# Patient Record
Sex: FEMALE | Race: WHITE | NOT HISPANIC OR LATINO | URBAN - METROPOLITAN AREA
[De-identification: names, ages, dates, MRNs, and addresses within clinical notes are randomized per-mention and may not be internally consistent; named-entity substitution may affect disease eponyms.]

---

## 2018-12-27 PROBLEM — Z00.00 ENCOUNTER FOR PREVENTIVE HEALTH EXAMINATION: Status: ACTIVE | Noted: 2018-12-27

## 2021-08-05 ENCOUNTER — INPATIENT (INPATIENT)
Facility: HOSPITAL | Age: 86
LOS: 2 days | Discharge: HOME | DRG: 187 | End: 2021-08-08
Attending: HOSPITALIST | Admitting: INTERNAL MEDICINE
Payer: MEDICARE

## 2021-08-05 ENCOUNTER — RESULT REVIEW (OUTPATIENT)
Age: 86
End: 2021-08-05

## 2021-08-05 VITALS
WEIGHT: 100.09 LBS | RESPIRATION RATE: 18 BRPM | HEART RATE: 73 BPM | HEIGHT: 62 IN | SYSTOLIC BLOOD PRESSURE: 103 MMHG | TEMPERATURE: 98 F | DIASTOLIC BLOOD PRESSURE: 67 MMHG | OXYGEN SATURATION: 96 %

## 2021-08-05 DIAGNOSIS — J90 PLEURAL EFFUSION, NOT ELSEWHERE CLASSIFIED: ICD-10-CM

## 2021-08-05 DIAGNOSIS — I48.91 UNSPECIFIED ATRIAL FIBRILLATION: ICD-10-CM

## 2021-08-05 LAB
ALBUMIN SERPL ELPH-MCNC: 3.7 G/DL — SIGNIFICANT CHANGE UP (ref 3.3–5.2)
ALP SERPL-CCNC: 67 U/L — SIGNIFICANT CHANGE UP (ref 40–120)
ALT FLD-CCNC: 11 U/L — SIGNIFICANT CHANGE UP
ANION GAP SERPL CALC-SCNC: 11 MMOL/L — SIGNIFICANT CHANGE UP (ref 5–17)
AST SERPL-CCNC: 25 U/L — SIGNIFICANT CHANGE UP
BASOPHILS # BLD AUTO: 0.04 K/UL — SIGNIFICANT CHANGE UP (ref 0–0.2)
BASOPHILS NFR BLD AUTO: 0.9 % — SIGNIFICANT CHANGE UP (ref 0–2)
BILIRUB SERPL-MCNC: 0.9 MG/DL — SIGNIFICANT CHANGE UP (ref 0.4–2)
BUN SERPL-MCNC: 16.7 MG/DL — SIGNIFICANT CHANGE UP (ref 8–20)
CALCIUM SERPL-MCNC: 9.6 MG/DL — SIGNIFICANT CHANGE UP (ref 8.6–10.2)
CHLORIDE SERPL-SCNC: 102 MMOL/L — SIGNIFICANT CHANGE UP (ref 98–107)
CO2 SERPL-SCNC: 22 MMOL/L — SIGNIFICANT CHANGE UP (ref 22–29)
CREAT SERPL-MCNC: 0.64 MG/DL — SIGNIFICANT CHANGE UP (ref 0.5–1.3)
EOSINOPHIL # BLD AUTO: 0.1 K/UL — SIGNIFICANT CHANGE UP (ref 0–0.5)
EOSINOPHIL NFR BLD AUTO: 2.2 % — SIGNIFICANT CHANGE UP (ref 0–6)
GLUCOSE SERPL-MCNC: 101 MG/DL — HIGH (ref 70–99)
HCT VFR BLD CALC: 35.6 % — SIGNIFICANT CHANGE UP (ref 34.5–45)
HGB BLD-MCNC: 12.2 G/DL — SIGNIFICANT CHANGE UP (ref 11.5–15.5)
IMM GRANULOCYTES NFR BLD AUTO: 0.2 % — SIGNIFICANT CHANGE UP (ref 0–1.5)
LYMPHOCYTES # BLD AUTO: 0.66 K/UL — LOW (ref 1–3.3)
LYMPHOCYTES # BLD AUTO: 14.3 % — SIGNIFICANT CHANGE UP (ref 13–44)
MCHC RBC-ENTMCNC: 31.6 PG — SIGNIFICANT CHANGE UP (ref 27–34)
MCHC RBC-ENTMCNC: 34.3 GM/DL — SIGNIFICANT CHANGE UP (ref 32–36)
MCV RBC AUTO: 92.2 FL — SIGNIFICANT CHANGE UP (ref 80–100)
MONOCYTES # BLD AUTO: 0.37 K/UL — SIGNIFICANT CHANGE UP (ref 0–0.9)
MONOCYTES NFR BLD AUTO: 8 % — SIGNIFICANT CHANGE UP (ref 2–14)
NEUTROPHILS # BLD AUTO: 3.45 K/UL — SIGNIFICANT CHANGE UP (ref 1.8–7.4)
NEUTROPHILS NFR BLD AUTO: 74.4 % — SIGNIFICANT CHANGE UP (ref 43–77)
NT-PROBNP SERPL-SCNC: 1057 PG/ML — HIGH (ref 0–300)
PLATELET # BLD AUTO: 202 K/UL — SIGNIFICANT CHANGE UP (ref 150–400)
POTASSIUM SERPL-MCNC: 4 MMOL/L — SIGNIFICANT CHANGE UP (ref 3.5–5.3)
POTASSIUM SERPL-SCNC: 4 MMOL/L — SIGNIFICANT CHANGE UP (ref 3.5–5.3)
PROT SERPL-MCNC: 6.8 G/DL — SIGNIFICANT CHANGE UP (ref 6.6–8.7)
RBC # BLD: 3.86 M/UL — SIGNIFICANT CHANGE UP (ref 3.8–5.2)
RBC # FLD: 13.7 % — SIGNIFICANT CHANGE UP (ref 10.3–14.5)
SARS-COV-2 RNA SPEC QL NAA+PROBE: SIGNIFICANT CHANGE UP
SODIUM SERPL-SCNC: 135 MMOL/L — SIGNIFICANT CHANGE UP (ref 135–145)
WBC # BLD: 4.63 K/UL — SIGNIFICANT CHANGE UP (ref 3.8–10.5)
WBC # FLD AUTO: 4.63 K/UL — SIGNIFICANT CHANGE UP (ref 3.8–10.5)

## 2021-08-05 PROCEDURE — 71260 CT THORAX DX C+: CPT | Mod: 26,MG

## 2021-08-05 PROCEDURE — 99222 1ST HOSP IP/OBS MODERATE 55: CPT

## 2021-08-05 PROCEDURE — G1004: CPT

## 2021-08-05 PROCEDURE — 99285 EMERGENCY DEPT VISIT HI MDM: CPT | Mod: GC

## 2021-08-05 PROCEDURE — 72125 CT NECK SPINE W/O DYE: CPT | Mod: 26,MH

## 2021-08-05 PROCEDURE — 70450 CT HEAD/BRAIN W/O DYE: CPT | Mod: 26,MH

## 2021-08-05 PROCEDURE — 93010 ELECTROCARDIOGRAM REPORT: CPT

## 2021-08-05 PROCEDURE — 99221 1ST HOSP IP/OBS SF/LOW 40: CPT

## 2021-08-05 RX ORDER — DILTIAZEM HCL 120 MG
60 CAPSULE, EXT RELEASE 24 HR ORAL
Refills: 0 | Status: DISCONTINUED | OUTPATIENT
Start: 2021-08-05 | End: 2021-08-05

## 2021-08-05 RX ORDER — ACETAMINOPHEN 500 MG
650 TABLET ORAL EVERY 6 HOURS
Refills: 0 | Status: DISCONTINUED | OUTPATIENT
Start: 2021-08-05 | End: 2021-08-08

## 2021-08-05 RX ORDER — METOPROLOL TARTRATE 50 MG
25 TABLET ORAL EVERY 12 HOURS
Refills: 0 | Status: DISCONTINUED | OUTPATIENT
Start: 2021-08-05 | End: 2021-08-08

## 2021-08-05 RX ORDER — METOPROLOL TARTRATE 50 MG
25 TABLET ORAL ONCE
Refills: 0 | Status: COMPLETED | OUTPATIENT
Start: 2021-08-05 | End: 2021-08-05

## 2021-08-05 RX ORDER — POLYETHYLENE GLYCOL 3350 17 G/17G
17 POWDER, FOR SOLUTION ORAL
Refills: 0 | Status: DISCONTINUED | OUTPATIENT
Start: 2021-08-05 | End: 2021-08-08

## 2021-08-05 RX ORDER — APIXABAN 2.5 MG/1
2.5 TABLET, FILM COATED ORAL EVERY 12 HOURS
Refills: 0 | Status: DISCONTINUED | OUTPATIENT
Start: 2021-08-05 | End: 2021-08-05

## 2021-08-05 RX ORDER — LANOLIN ALCOHOL/MO/W.PET/CERES
3 CREAM (GRAM) TOPICAL AT BEDTIME
Refills: 0 | Status: DISCONTINUED | OUTPATIENT
Start: 2021-08-05 | End: 2021-08-08

## 2021-08-05 RX ORDER — METOPROLOL TARTRATE 50 MG
25 TABLET ORAL DAILY
Refills: 0 | Status: DISCONTINUED | OUTPATIENT
Start: 2021-08-05 | End: 2021-08-05

## 2021-08-05 RX ORDER — DILTIAZEM HCL 120 MG
120 CAPSULE, EXT RELEASE 24 HR ORAL DAILY
Refills: 0 | Status: DISCONTINUED | OUTPATIENT
Start: 2021-08-05 | End: 2021-08-08

## 2021-08-05 RX ORDER — METOPROLOL TARTRATE 50 MG
2.5 TABLET ORAL ONCE
Refills: 0 | Status: COMPLETED | OUTPATIENT
Start: 2021-08-05 | End: 2021-08-05

## 2021-08-05 RX ADMIN — Medication 120 MILLIGRAM(S): at 21:18

## 2021-08-05 RX ADMIN — Medication 2.5 MILLIGRAM(S): at 11:11

## 2021-08-05 RX ADMIN — Medication 25 MILLIGRAM(S): at 11:11

## 2021-08-05 RX ADMIN — Medication 25 MILLIGRAM(S): at 19:00

## 2021-08-05 NOTE — CONSULT NOTE ADULT - SUBJECTIVE AND OBJECTIVE BOX
HPI: 89 year old female w AFIB on eliquis, CHF, HTN arrives from [facility]  to ED by ambulance after fall , hitting head. Pt fell out of wheelchair striking L cheek and head, this occurred when she was getting into her wheelchair, fell  hitting her head on dresser no LOC. Reported a fall in [place] 2 months ago from wheelchair w Right leg soft tissue injury. CT surgery was consulted after CT chest revealed right pleural effusion.  At the time of my examination patient lying in bed, no acute distress noted, denies chest pian, pressure, palpitation, shortness of breath, SpO2 97% on room air, no accessory muscle use noted.     PAST MEDICAL HX  AFIB atrial fibrillation, unspecified type    CHF congestive heart failure, unspecified HF chronicity, unspecified heart failure type    Falls  HTN hypertension, unspecified type  Pulmonary nodule on R      PAST SURGICAL HX  L knee replacement  Cataracts        FAMILY HX  was not able to obtain      SOCIAL HX  just moved from [place] back to [facility]  nonsmoker  nonambulatory (05 Aug 2021 14:33)      PAST MEDICAL & SURGICAL HISTORY:  Hypertension, unspecified type    Congestive heart failure, unspecified HF chronicity, unspecified heart failure type    Atrial fibrillation, unspecified type    No significant past surgical history        MEDICATIONS  (STANDING):  diltiazem    milliGRAM(s) Oral daily  metoprolol tartrate 25 milliGRAM(s) Oral every 12 hours  polyethylene glycol 3350 17 Gram(s) Oral two times a day    MEDICATIONS  (PRN):  acetaminophen   Tablet .. 650 milliGRAM(s) Oral every 6 hours PRN Temp greater or equal to 38.5C (101.3F), Mild Pain (1 - 3)  aluminum hydroxide/magnesium hydroxide/simethicone Suspension 30 milliLiter(s) Oral every 4 hours PRN Dyspepsia  melatonin 3 milliGRAM(s) Oral at bedtime PRN Insomnia    Relevant Family History  FAMILY HISTORY:      Review of Systems  GENERAL:  Fevers[] chills[] sweats[] fatigue[] weight loss[] weight gain []                                      [x ] NEGATIVE  NEURO:  parathesias[] seizures []  syncope []  confusion []                                                                [ x] NEGATIVE                 EYES: glasses[]  blurry vision[]  discharge[] pain[] glaucoma []                                                           [x ] NEGATIVE                 ENMT:  difficulty hearing []  vertigo[]  dysphagia[] epistaxis[] recent dental work []                     [x ] NEGATIVE                 CV:  chest pain[] palpitations[] OSWALD [] diaphoresis [] edema[]                                                           [x ] NEGATIVE                                 RESPIRATORY:  wheezing[] SOB[] cough [x] sputum[] hemoptysis[]                                                   [ ] NEGATIVE               GI:  nausea[]  vomiting []  diarrhea[] constipation [] melena []                                                          [x ] NEGATIVE            : hematuria[ ]  dysuria[ ] urgency[] incontinence[]                                                                          [x ] NEGATIVE                    MUSKULOSKELETAL:  arthritis[ ]  joint swelling [ ] muscle weakness [ ]                                            [x ] NEGATIVE                     SKIN/BREAST:  rash[ ] itching [ ]  hair loss[ ] masses[ ]                                                                          [ ] NEGATIVE                     PSYCH:  dementia [ ] depression [ ] anxiety[ ]                                                                                          [x ] NEGATIVE                        HEME/LYMPH:  bruises easily[ ] enlarged lymph nodes[ ] tender lymph nodes[ ]                           [x] NEGATIVE                      ENDOCRINE:  cold intolerance[ ] heat intolerance[ ] polydipsia[ ]                                                      [x ] NEGATIVE                            PHYSICAL EXAM  General:  no acute distress.                                                         Neuro: Alert, awake, no focal deficits noted  Neck: Supple, no JVD  Lungs: Diminished breath sounds on right base, left clear. No rales, rhonchi or wheezes. No accessory muscle use noted                                                                      CVS: Irregular, normal S1S2  Abdomen:  soft, nontender, nondistended, positive bowel sounds  Extremities:  warm, well perfused, no edema, +DP pulses bilaterally  Skin: Abrasion on left side of the cheek. Scab on right shin, intact.                                                                           Vital Signs Last 24 Hrs  T(C): 37 (05 Aug 2021 19:26), Max: 37 (05 Aug 2021 19:26)  T(F): 98.6 (05 Aug 2021 19:26), Max: 98.6 (05 Aug 2021 19:26)  HR: 118 (05 Aug 2021 19:26) (73 - 136)  BP: 120/67 (05 Aug 2021 19:26) (103/67 - 136/60)  BP(mean): --  RR: 18 (05 Aug 2021 19:26) (18 - 20)  SpO2: 96% (05 Aug 2021 19:26) (96% - 100%) on room air                                                          LABS:                        12.2   4.63  )-----------( 202      ( 05 Aug 2021 10:47 )             35.6     08-05    135  |  102  |  16.7  ----------------------------<  101<H>  4.0   |  22.0  |  0.64    Ca    9.6      05 Aug 2021 10:47    TPro  6.8  /  Alb  3.7  /  TBili  0.9  /  DBili  x   /  AST  25  /  ALT  11  /  AlkPhos  67  08-05    < from: CT Chest w/ IV Cont (08.05.21 @ 12:03) >    IMPRESSION:  Large right pleural effusion.    11 mm spiculated right upper lobe pulmonary nodule suspicious for neoplasm.    Mild mediastinal and right hilar lymphadenopathy.    Chronic benign-appearing deformities of the left second and third ribs and left scapula.    Comminuted right humeral head fracture which is likely chronic.    < end of copied text >

## 2021-08-05 NOTE — ED ADULT TRIAGE NOTE - MODE OF ARRIVAL
Discharge instructions given through interpretor. Pt understands, no new questions.    EMS Ambulance

## 2021-08-05 NOTE — CONSULT NOTE ADULT - PROBLEM SELECTOR RECOMMENDATION 2
On Eliquis, last dose last night  Will hold today for chest tube placement in AM  Continue Metoprolol, Cardizem  Continue further care per primary team

## 2021-08-05 NOTE — H&P ADULT - HISTORY OF PRESENT ILLNESS
89 year old female w AFIB on eliquis, CHF, HTN arrives from [facility]  to ED by ambulance after fall , hitting head    Pt fell out of wheelchair striking L cheek and head  this occurred when she was getting into her wheelchair  fell  hitting her head on dresser no LOC      In ED /67  HR 73  RR 18   T 98.1   96% RA  EKG AFIB 111 w NSST-T changes  then AFIB 140 on monitor  lopressor 2.5 mg IV then 25 mg po  CT head and Cspine no acute changes  remodeling of post rib #2 , #3 and scapula; thyroid nodule 1.9 and large R pleural effusion  CT chest pending        PAST MEDICAL HX  AFIB atrial fibrillation, unspecified type    CHF congestive heart failure, unspecified HF chronicity, unspecified heart failure type    Falls  HTN hypertension, unspecified type.  Pulmonary nodule on R      PAST SURGICAL HX  L knee replacement  Cataracts        FAMILY HX  was not able to obtain      SOCIAL HX  just moved from [place] back to [facility]  nonsmoker  nonambulatory 89 year old female w AFIB on eliquis, CHF, HTN arrives from [facility]  to ED by ambulance after fall , hitting head    Pt fell out of wheelchair striking L cheek and head  this occurred when she was getting into her wheelchair  fell  hitting her head on dresser no LOC    Reported a fall in [place] 2 months ago from wheelchair w R leg soft tissue injury    Ambulance call report not available for review      In ED /67  HR 73  RR 18   T 98.1   96% RA  EKG AFIB 111 w NSST-T changes  then AFIB 140 on monitor  lopressor 2.5 mg IV then 25 mg po  CT head and Cspine no acute changes  remodeling of post rib #2 , #3 and scapula; thyroid nodule 1.9 and large R pleural effusion  CT chest pending        PAST MEDICAL HX  AFIB atrial fibrillation, unspecified type    CHF congestive heart failure, unspecified HF chronicity, unspecified heart failure type    Falls  HTN hypertension, unspecified type.  Pulmonary nodule on R      PAST SURGICAL HX  L knee replacement  Cataracts        FAMILY HX  was not able to obtain      SOCIAL HX  just moved from [place] back to [facility]  nonsmoker  nonambulatory

## 2021-08-05 NOTE — ED ADULT NURSE NOTE - ALCOHOL PRE SCREEN (AUDIT - C)
Family does not want veronica placed due to increased risk of bleeding from the tPA and potential trauma of trying to insert veronica. Pure wick placed instead.       Josee Perry RN  03/26/21 1954 Statement Selected

## 2021-08-05 NOTE — ED PROVIDER NOTE - CLINICAL SUMMARY MEDICAL DECISION MAKING FREE TEXT BOX
89 y/o F with PMHx HTN, CHF, AFib on Eliquis, who was BIBA s/p fall out of wheelchair and head strike on night-stand while getting out of bed this morning. 89 y/o F with PMHx HTN, CHF, AFib on Eliquis, who was BIBA s/p fall out of wheelchair and head strike on night-stand while getting out of bed this morning. Non-con CT Head negative for bleed. Incidental findings of L 2nd/3rd rib lesions, thyroid nodules, and large R pleural effusion. Ordered basic labs and CT chest with IV contrast. 89 y/o F with PMHx HTN, CHF, AFib on Eliquis, who was BIBA s/p fall out of wheelchair and head strike on night-stand while getting out of bed this morning. Non-con CT Head negative for bleed. Incidental findings of L 2nd/3rd rib lesions, thyroid nodules, and large R pleural effusion. CT Chest with IV contrast showing large R sided pleural effusion and well as 11 mm mass c/f malignancy. Admit for further management of potential malignant effusion.

## 2021-08-05 NOTE — ED ADULT NURSE NOTE - OBJECTIVE STATEMENT
pt a+ox3, presents to ED s/p fall from bed this morning. pt states she has right sided weakness every now and then, reports feeling a little weaker than normal this morning and lost footing while getting out of bed and slipped forward. pt reports hitting left cheek, denies LOC. pt offers no complaints at this time. will continue to monitor.

## 2021-08-05 NOTE — ED PROVIDER NOTE - NS ED ROS FT
Constitutional: no fever, no chills  Head: NC  Eyes: no redness   ENMT: no nasal congestion/drainage, no sore throat   CV: no chest pain, no edema  Resp: no cough, no dyspnea  GI: no abdominal pain, no nausea, no vomiting, no diarrhea  : no dysuria, no hematuria   Skin: no lesions, no rashes   Neuro: no LOC, no headache, no sensory deficits, no weakness

## 2021-08-05 NOTE — CONSULT NOTE ADULT - ASSESSMENT
89 year old female with PMH of AFIB on Eliquis CHF, HTN arrives from [facility]  to ED by ambulance after fall , hitting head after a mechanical fall.  Reported a fall in [place] 2 months ago from wheelchair w Right leg soft tissue injury. CT surgery was consulted after CT chest revealed right pleural effusion.

## 2021-08-05 NOTE — H&P ADULT - NSHPLABSRESULTS_GEN_ALL_CORE
EXAM: CT CHEST IC    PROCEDURE DATE: 08/05/2021        INTERPRETATION: CLINICAL INFORMATION: Right pleural effusion and left second/third rib lesions    COMPARISON: CT cervical spine from the same day was reviewed.    CONTRAST/COMPLICATIONS:  IV Contrast: Omnipaque 300 95 cc administered 5 cc discarded  Oral Contrast: NONE  Complications: None reported at time of study completion    PROCEDURE:  CT of the Chest was performed.  Sagittal and coronal reformats were performed.    FINDINGS:    LUNGS AND AIRWAYS: Patent central airways. Mild emphysema.  * Right upper lobe pulmonary nodule (3; 23).  * 11 mm spiculated right upper lobe nodule (3; 42).  PLEURA: Large right pleural effusion.  MEDIASTINUM AND ASHLYN:  * Mildly enlarged mediastinal lymph nodes measuring up to 1.1 cm in the lower paratracheal region (3; 58).  * 10 mm right hilar lymph node (3; 64).  VESSELS: Atherosclerotic arterial calcifications, including three-vessel coronary artery calcification.  HEART: Cardiomegaly. No pericardial effusion.  CHEST WALL AND LOWER NECK: Small bilateral thyroid nodules.  VISUALIZED UPPER ABDOMEN: Small hiatal hernia. High density in the gallbladder lumen could represent sludge or stones. Ectopic location of the right kidney, located in the anterior right upper quadrant.  BONES: Mild chronic appearing deformity of the left second and third ribs. Comminuted right humeral head fracture which appears chronic.    IMPRESSION:  Large right pleural effusion.    11 mm spiculated right upper lobe pulmonary nodule suspicious for neoplasm.    Mild mediastinal and right hilar lymphadenopathy.    Chronic benign-appearing deformities of the left second and third ribs and left scapula.    Comminuted right humeral head fracture which is likely chronic.      --- End of Report ---      ISATU EARL MD; Attending Radiologist  This document has been electronically signed. Aug 5 2021 12:23PM

## 2021-08-05 NOTE — CONSULT NOTE ADULT - PROBLEM SELECTOR RECOMMENDATION 9
CT chest with moderate to large pleural effusion on right   Patient is currently on room air, no respiratory distress noted, no accessory muscle use, able to communicate in full sentences, spo2 97% on room air  Patient receives Eliquis for Afib, last dose last night  Will hold Eliquis today for chest tube placement in AM  Please continue to monitor, continue tele, supplemental O2 as needed  Plan discussed with Dr. Chatman

## 2021-08-05 NOTE — ED ADULT NURSE NOTE - CHIEF COMPLAINT QUOTE
patient arrives from [facility] for fall today while she was trying to get back into bed. she fell backwards and hit head on dresser. patient is on Eliquis and has a hematoma to the back of the head. MD called to bedside and Priority CT called. Patient is alert and oriented x3. and denies LOC

## 2021-08-05 NOTE — ED PROVIDER NOTE - PHYSICAL EXAMINATION
General: well appearing, NAD  Head: NC, small hematoma on posterior L occipital lobe, no spinal tenderness/step-offs  EENT: EOMI, no scleral icterus  Cardiac: RRR, no apparent murmurs, no lower extremity edema  Respiratory: CTABL, no respiratory distress   Abdomen: soft, ND, NT, nonperitonitic  MSK/Vascular: full ROM, soft compartments, warm extremities  Neuro: AAOx3, sensation to light touch intact  Psych: calm, cooperative

## 2021-08-05 NOTE — PHARMACOTHERAPY INTERVENTION NOTE - COMMENTS
Pt is on metoprolol tartrate 25 mG BID and diltiazem ER 60 mg BID. Recommended continuing outpatient medications with hold parameters to avoid interruptions in therapy. Pt is on metoprolol tartrate 25 mG BID and diltiazem ER 60 mg BID. Recommended continuing outpatient medications with hold parameters to avoid interruptions in therapy. Diltiazem ER 60 mG nonformulary. Recommended  mG qday or IR 30 mG q6h with hold parameters while patient is admitted.

## 2021-08-05 NOTE — H&P ADULT - NSHPPHYSICALEXAM_GEN_ALL_CORE
Vital Signs Last 24 Hrs  T(C): 36.7 (05 Aug 2021 08:37), Max: 36.7 (05 Aug 2021 08:37)  T(F): 98.1 (05 Aug 2021 08:37), Max: 98.1 (05 Aug 2021 08:37)  HR: 136 (05 Aug 2021 11:04) (73 - 136)  BP: 136/60 (05 Aug 2021 11:04) (103/67 - 136/60)  BP(mean): --  RR: 20 (05 Aug 2021 11:04) (18 - 20)  SpO2: 100% (05 Aug 2021 11:04) (96% - 100%)      Telemedicine precludes detailed examination  pt is alert and oriented  normal speech  +L malar ecchymosis and smaller ecchymosis upper and lower lip

## 2021-08-05 NOTE — H&P ADULT - ASSESSMENT
#Mechanical fall: initial encounter  #Soft tissue injury to head and face  1. admit to med  2. neuro check q 4 hrs  3. fall risk        #AFIB  #had -140 in ED  #HTN  1. monitor on telemetry  2. serial trop  3. s/p lopressor 2.5 mg IV and 25 mg po  4  metorprolol 25 mg qd w parameters  5. diltiazem 60 mg BID w parameters  6. eliquis 2.5 mg        #Large R pleural effusion  #spiculated mass w rib #2 #3 and scapula remodeling  pt states she will not proceed w any work up until family arrives  1. monitor O2 sat  2. to consider thoracentesis for dx      #VTE  IMPROVE VTE Individual Risk Assessment    RISK                                                                Points    [  ] Previous VTE                                                  3    [  ] Thrombophilia                                               2    [  ] Lower limb paralysis                                      2        (unable to hold up >15 seconds)      [  ] Current Cancer                                              2         (within 6 months)    [  ] Immobilization > 24 hrs                                1    [  ] ICU/CCU stay > 24 hours                              1    [ X ] Age > 60                                                      1    IMPROVE VTE Score __1-3______    IMPROVE Score 0-1: Low Risk, No VTE prophylaxis required for most patients, encourage ambulation.   IMPROVE Score 2-3: At risk, pharmacologic VTE prophylaxis is indicated for most patients (in the absence of a contraindication)  IMPROVE Score > or = 4: High Risk, pharmacologic VTE prophylaxis is indicated for most patients (in the absence of a contraindication)      VTE on eliquis  med rec from patient paper w list to ED      FOLLOW UP  exam  CT chest  discuss w pt and family work up         #Mechanical fall: initial encounter  #Soft tissue injury to head and face  1. admit to med  2. neuro check q 4 hrs  3. fall risk        #AFIB  #had -140 in ED  #HTN  1. monitor on telemetry  2. serial trop  3. s/p lopressor 2.5 mg IV and 25 mg po  4  metorprolol 25 mg qd w parameters  5. diltiazem 60 mg BID w parameters  6. eliquis 2.5 mg        #Large R pleural effusion  #spiculated mass w rib #2 #3 and scapula remodeling  pt states she will not proceed w any work up until family arrives  1. monitor O2 sat  2. to consider thoracentesis for dx      #VTE  IMPROVE VTE Individual Risk Assessment    RISK                                                                Points    [  ] Previous VTE                                                  3    [  ] Thrombophilia                                               2    [  ] Lower limb paralysis                                      2        (unable to hold up >15 seconds)      [  ] Current Cancer                                              2         (within 6 months)    [  ] Immobilization > 24 hrs                                1    [  ] ICU/CCU stay > 24 hours                              1    [ X ] Age > 60                                                      1    IMPROVE VTE Score __1-3______    IMPROVE Score 0-1: Low Risk, No VTE prophylaxis required for most patients, encourage ambulation.   IMPROVE Score 2-3: At risk, pharmacologic VTE prophylaxis is indicated for most patients (in the absence of a contraindication)  IMPROVE Score > or = 4: High Risk, pharmacologic VTE prophylaxis is indicated for most patients (in the absence of a contraindication)      VTE on eliquis  med rec from patient paper w list to ED  contacted ED pharmacy to assist in completing med rec    FOLLOW UP  exam  CT chest  discuss w pt and family work up         #Mechanical fall: initial encounter  #Soft tissue injury to head and face  1. admit to med  2. neuro check q 4 hrs  3. fall risk        #AFIB  #had -140 in ED  #HTN  1. monitor on telemetry  2. serial trop  3. s/p lopressor 2.5 mg IV and 25 mg po  4  metorprolol 25 mg qd w parameters  5. diltiazem 60 mg BID w parameters  6. eliquis 2.5 mg BID        #Large R pleural effusion  #spiculated mass w rib #2 #3 and scapula remodeling  pt states she will not proceed w any work up until family arrives  1. monitor O2 sat  2. to consider thoracentesis for dx      #VTE  IMPROVE VTE Individual Risk Assessment    RISK                                                                Points    [  ] Previous VTE                                                  3    [  ] Thrombophilia                                               2    [  ] Lower limb paralysis                                      2        (unable to hold up >15 seconds)      [  ] Current Cancer                                              2         (within 6 months)    [  ] Immobilization > 24 hrs                                1    [  ] ICU/CCU stay > 24 hours                              1    [ X ] Age > 60                                                      1    IMPROVE VTE Score __1-3______    IMPROVE Score 0-1: Low Risk, No VTE prophylaxis required for most patients, encourage ambulation.   IMPROVE Score 2-3: At risk, pharmacologic VTE prophylaxis is indicated for most patients (in the absence of a contraindication)  IMPROVE Score > or = 4: High Risk, pharmacologic VTE prophylaxis is indicated for most patients (in the absence of a contraindication)      VTE on eliquis  med rec from patient paper w list to ED  contacted ED pharmacy to assist in completing med rec    FOLLOW UP  exam  discuss w pt and family work up  delayed CT surgery consult

## 2021-08-05 NOTE — CHART NOTE - NSCHARTNOTEFT_GEN_A_CORE
SOCIAL WORK NOTE:  PATIENT IS FROM THE [facility]. PATIENT RECENTLY MOVED IN THERE FROM [place], [place].  PATIENT SUSTAINED A FALL FROM WHEELCHAIR AND HIT HEAD ON CORNER OF NIGHT STAND. PATIENT IS ALERT AND ORIENTED X 3.  SPOKE WITH GRANDDAUGHTER- KEESHA SIMONS # [phone] WHOM APPRISED ME OF ABOVE INFORMATION. SHE REPORTS PATIENT TO BE AXOX4, PLEASANT AND COOPERATIVE.

## 2021-08-05 NOTE — ED PROVIDER NOTE - OBJECTIVE STATEMENT
89 y/o F with PMHx HTN, CHF, AFib on Eliquis, who was BIBA s/p fall out of wheelchair and head strike on night-stand while getting out of bed this morning. Patient states that she was getting into her wheelchair when she slid out and hit her head on her night stand. Denies any LOC. Per EMS, on Eliquis. States that she has baseline L sided weakness. Denies any dizziness, lightheadedness, changes in vision, chest pain, shortness of breath, N/V/D, or abdominal pain. 89 y/o F with PMHx HTN, CHF, AFib on Eliquis, who was BIBA s/p fall out of wheelchair and head strike on night-stand while getting out of bed this morning. Patient states that she was getting into her wheelchair when she slid out and hit her head on her night stand. Denies any LOC. Per EMS, on Eliquis. States that she has baseline L sided weakness. Denies any recent illnesses. States that she just moved to [place] from [place] two days ago and lives at [facility]. States that she does not have a cardiologist here. Denies any dizziness, lightheadedness, changes in vision, chest pain, shortness of breath, N/V/D, or abdominal pain.

## 2021-08-05 NOTE — ED PROVIDER NOTE - ATTENDING CONTRIBUTION TO CARE
AJM: 89 y/o F with PMHx HTN, CHF, AFib on Eliquis, who was BIBA s/p fall out of wheelchair and head strike on night-stand while getting out of bed this morning. Patient states that she was getting into her wheelchair when she slid out and hit her head on her night stand. Denies any LOC. Per EMS, on Eliquis. no new neuro deficits. will obtain ct head and cspine. no other injuries noted. no hip pain. no lacs

## 2021-08-05 NOTE — ED PROVIDER NOTE - PMH
Atrial fibrillation, unspecified type    Congestive heart failure, unspecified HF chronicity, unspecified heart failure type    Hypertension, unspecified type

## 2021-08-05 NOTE — H&P ADULT - ATTENDING COMMENTS
patient is 89 year old female w AFIB on eliquis, CHF, HTN arrives from [facility]  to ED by ambulance after fall , hitting head. patient recently moved from [place] to be close to her family and moved to [place]. Pt fell out of wheelchair striking L cheek and head while  she was getting into her wheelchair, patient fell  hitting her head on dresser, denies any  LOC. in ed  patient  had CT head and Cspine no acute changes  remodeling of post rib #2 , #3 and scapula; thyroid nodule 1.9 and large R pleural effusion. CT chest showed rt sided large plural effusion and spiculated mass. findings discussed with patient and son and daughter in law at bedside. patient is ok to be admitted for fluid drainage but doesnot want to stay longer for test results. patient on eliquis , last dose taken last pm. patient denies any sob , cp, dizziness.     Vital Signs Last 24 Hrs  T(C): 36.6 (05 Aug 2021 15:39), Max: 36.7 (05 Aug 2021 08:37)  T(F): 97.9 (05 Aug 2021 15:39), Max: 98.1 (05 Aug 2021 08:37)  HR: 105 (05 Aug 2021 15:39) (73 - 136)  BP: 106/61 (05 Aug 2021 15:39) (103/67 - 136/60)  BP(mean): --  RR: 18 (05 Aug 2021 15:39) (18 - 20)  SpO2: 97% (05 Aug 2021 15:39) (96% - 100%)    gen : awake , alert , x 4 , hog , comfortable.  heen t: left cheek scab noted , dried blood   cvs : s1,s2, rrr   resp : diminished bs on rt side. good air entry left lung   abd: soft , nt , bs nl   ext : no edema b/l   neuro: non focal      < from: CT Chest w/ IV Cont (08.05.21 @ 12:03) >    IMPRESSION:  Large right pleural effusion.11 mm spiculated right upper lobe pulmonary nodule suspicious for neoplasm. Mild mediastinal and right hilar lymphadenopathy. Chronic benign-appearing deformities of the left second and third ribs and left scapula. Comminuted right humeral head fracture which is likely chronic.    > large right pleural effusion   - ct sx called , plan for dx/ therapeutic tap   -    - hold eliquis for tonight     > pt eval   > fall precautions   > c/w remainder of home meds

## 2021-08-05 NOTE — ED ADULT TRIAGE NOTE - CHIEF COMPLAINT QUOTE
patient arrives from [facility] for fall today while she was trying to get back into bed. she fell backwards and hit head on dresser. patient is on Eliquis and has a hematoma to the back of the head. MD called to bedside and Priority CT called. Patient is alert and oriented x3. and denies LOC

## 2021-08-06 LAB
A1C WITH ESTIMATED AVERAGE GLUCOSE RESULT: 5.5 % — SIGNIFICANT CHANGE UP (ref 4–5.6)
ALBUMIN FLD-MCNC: 1.7 G/DL — SIGNIFICANT CHANGE UP
ALBUMIN SERPL ELPH-MCNC: 3.4 G/DL — SIGNIFICANT CHANGE UP (ref 3.3–5.2)
ALP SERPL-CCNC: 62 U/L — SIGNIFICANT CHANGE UP (ref 40–120)
ALT FLD-CCNC: 9 U/L — SIGNIFICANT CHANGE UP
ANION GAP SERPL CALC-SCNC: 10 MMOL/L — SIGNIFICANT CHANGE UP (ref 5–17)
ANION GAP SERPL CALC-SCNC: 13 MMOL/L — SIGNIFICANT CHANGE UP (ref 5–17)
AST SERPL-CCNC: 21 U/L — SIGNIFICANT CHANGE UP
B PERT IGG+IGM PNL SER: ABNORMAL
BILIRUB SERPL-MCNC: 0.8 MG/DL — SIGNIFICANT CHANGE UP (ref 0.4–2)
BUN SERPL-MCNC: 13.8 MG/DL — SIGNIFICANT CHANGE UP (ref 8–20)
BUN SERPL-MCNC: 14.4 MG/DL — SIGNIFICANT CHANGE UP (ref 8–20)
CALCIUM SERPL-MCNC: 9.1 MG/DL — SIGNIFICANT CHANGE UP (ref 8.6–10.2)
CALCIUM SERPL-MCNC: 9.2 MG/DL — SIGNIFICANT CHANGE UP (ref 8.6–10.2)
CHLORIDE SERPL-SCNC: 102 MMOL/L — SIGNIFICANT CHANGE UP (ref 98–107)
CHLORIDE SERPL-SCNC: 102 MMOL/L — SIGNIFICANT CHANGE UP (ref 98–107)
CHOLEST SERPL-MCNC: 154 MG/DL — SIGNIFICANT CHANGE UP
CO2 SERPL-SCNC: 20 MMOL/L — LOW (ref 22–29)
CO2 SERPL-SCNC: 22 MMOL/L — SIGNIFICANT CHANGE UP (ref 22–29)
COLOR FLD: YELLOW
COMMENT - FLUIDS: SIGNIFICANT CHANGE UP
COVID-19 SPIKE DOMAIN AB INTERP: POSITIVE
COVID-19 SPIKE DOMAIN ANTIBODY RESULT: 175 U/ML — HIGH
CREAT SERPL-MCNC: 0.52 MG/DL — SIGNIFICANT CHANGE UP (ref 0.5–1.3)
CREAT SERPL-MCNC: 0.54 MG/DL — SIGNIFICANT CHANGE UP (ref 0.5–1.3)
ESTIMATED AVERAGE GLUCOSE: 111 MG/DL — SIGNIFICANT CHANGE UP (ref 68–114)
FLUID INTAKE SUBSTANCE CLASS: SIGNIFICANT CHANGE UP
FLUID SEGMENTED GRANULOCYTES: 6 % — SIGNIFICANT CHANGE UP
GLUCOSE FLD-MCNC: 96 MG/DL — SIGNIFICANT CHANGE UP
GLUCOSE SERPL-MCNC: 84 MG/DL — SIGNIFICANT CHANGE UP (ref 70–99)
GLUCOSE SERPL-MCNC: 84 MG/DL — SIGNIFICANT CHANGE UP (ref 70–99)
GRAM STN FLD: SIGNIFICANT CHANGE UP
HCT VFR BLD CALC: 34.8 % — SIGNIFICANT CHANGE UP (ref 34.5–45)
HDLC SERPL-MCNC: 69 MG/DL — SIGNIFICANT CHANGE UP
HGB BLD-MCNC: 11.8 G/DL — SIGNIFICANT CHANGE UP (ref 11.5–15.5)
INR BLD: 1.31 RATIO — HIGH (ref 0.88–1.16)
LDH SERPL L TO P-CCNC: 63 U/L — SIGNIFICANT CHANGE UP
LIPID PNL WITH DIRECT LDL SERPL: 72 MG/DL — SIGNIFICANT CHANGE UP
LYMPHOCYTES # FLD: 55 % — SIGNIFICANT CHANGE UP
MCHC RBC-ENTMCNC: 31.5 PG — SIGNIFICANT CHANGE UP (ref 27–34)
MCHC RBC-ENTMCNC: 33.9 GM/DL — SIGNIFICANT CHANGE UP (ref 32–36)
MCV RBC AUTO: 92.8 FL — SIGNIFICANT CHANGE UP (ref 80–100)
MESOTHL CELL # FLD: 4 % — SIGNIFICANT CHANGE UP
MONOS+MACROS # FLD: 35 % — SIGNIFICANT CHANGE UP
NON HDL CHOLESTEROL: 85 MG/DL — SIGNIFICANT CHANGE UP
PH FLD: 8.5 — SIGNIFICANT CHANGE UP
PLATELET # BLD AUTO: 183 K/UL — SIGNIFICANT CHANGE UP (ref 150–400)
POTASSIUM SERPL-MCNC: 3.6 MMOL/L — SIGNIFICANT CHANGE UP (ref 3.5–5.3)
POTASSIUM SERPL-MCNC: 3.7 MMOL/L — SIGNIFICANT CHANGE UP (ref 3.5–5.3)
POTASSIUM SERPL-SCNC: 3.6 MMOL/L — SIGNIFICANT CHANGE UP (ref 3.5–5.3)
POTASSIUM SERPL-SCNC: 3.7 MMOL/L — SIGNIFICANT CHANGE UP (ref 3.5–5.3)
PROT FLD-MCNC: 2.9 G/DL — SIGNIFICANT CHANGE UP
PROT SERPL-MCNC: 6.1 G/DL — LOW (ref 6.6–8.7)
PROTHROM AB SERPL-ACNC: 15 SEC — HIGH (ref 10.6–13.6)
RBC # BLD: 3.75 M/UL — LOW (ref 3.8–5.2)
RBC # FLD: 13.4 % — SIGNIFICANT CHANGE UP (ref 10.3–14.5)
RCV VOL RI: 1000 /UL — HIGH (ref 0–0)
SARS-COV-2 IGG+IGM SERPL QL IA: 175 U/ML — HIGH
SARS-COV-2 IGG+IGM SERPL QL IA: POSITIVE
SODIUM SERPL-SCNC: 134 MMOL/L — LOW (ref 135–145)
SODIUM SERPL-SCNC: 134 MMOL/L — LOW (ref 135–145)
SPECIMEN SOURCE FLD: SIGNIFICANT CHANGE UP
SPECIMEN SOURCE: SIGNIFICANT CHANGE UP
TOTAL NUCLEATED CELL COUNT, BODY FLUID: 252 /UL — SIGNIFICANT CHANGE UP
TRIGL SERPL-MCNC: 63 MG/DL — SIGNIFICANT CHANGE UP
TROPONIN T SERPL-MCNC: <0.01 NG/ML — SIGNIFICANT CHANGE UP (ref 0–0.06)
TSH SERPL-MCNC: 3.5 UIU/ML — SIGNIFICANT CHANGE UP (ref 0.27–4.2)
TUBE TYPE: SIGNIFICANT CHANGE UP
WBC # BLD: 5.19 K/UL — SIGNIFICANT CHANGE UP (ref 3.8–10.5)
WBC # FLD AUTO: 5.19 K/UL — SIGNIFICANT CHANGE UP (ref 3.8–10.5)

## 2021-08-06 PROCEDURE — 88112 CYTOPATH CELL ENHANCE TECH: CPT | Mod: 26

## 2021-08-06 PROCEDURE — 32557 INSERT CATH PLEURA W/ IMAGE: CPT

## 2021-08-06 PROCEDURE — 71045 X-RAY EXAM CHEST 1 VIEW: CPT | Mod: 26,77

## 2021-08-06 PROCEDURE — 71045 X-RAY EXAM CHEST 1 VIEW: CPT | Mod: 26,76

## 2021-08-06 PROCEDURE — 99231 SBSQ HOSP IP/OBS SF/LOW 25: CPT | Mod: 25

## 2021-08-06 PROCEDURE — 88305 TISSUE EXAM BY PATHOLOGIST: CPT | Mod: 26

## 2021-08-06 PROCEDURE — 99233 SBSQ HOSP IP/OBS HIGH 50: CPT

## 2021-08-06 RX ORDER — DILTIAZEM HCL 120 MG
1 CAPSULE, EXT RELEASE 24 HR ORAL
Qty: 0 | Refills: 0 | DISCHARGE

## 2021-08-06 RX ORDER — LACTOBACILLUS ACIDOPH-L.BULGARICUS 1 MILLION CELL CHEWABLE TABLET 1MM CELL
2 TABLET,CHEWABLE ORAL
Qty: 0 | Refills: 0 | DISCHARGE

## 2021-08-06 RX ORDER — METOPROLOL TARTRATE 50 MG
1 TABLET ORAL
Qty: 0 | Refills: 0 | DISCHARGE

## 2021-08-06 RX ORDER — POLYETHYLENE GLYCOL 3350 17 G/17G
17 POWDER, FOR SOLUTION ORAL
Qty: 0 | Refills: 0 | DISCHARGE

## 2021-08-06 RX ORDER — APIXABAN 2.5 MG/1
2.5 TABLET, FILM COATED ORAL EVERY 12 HOURS
Refills: 0 | Status: DISCONTINUED | OUTPATIENT
Start: 2021-08-07 | End: 2021-08-08

## 2021-08-06 RX ORDER — FLUOCINONIDE/EMOLLIENT BASE 0.05 %
1 CREAM (GRAM) TOPICAL
Qty: 0 | Refills: 0 | DISCHARGE

## 2021-08-06 RX ORDER — LIDOCAINE HCL 20 MG/ML
20 VIAL (ML) INJECTION ONCE
Refills: 0 | Status: DISCONTINUED | OUTPATIENT
Start: 2021-08-06 | End: 2021-08-08

## 2021-08-06 RX ORDER — CHOLECALCIFEROL (VITAMIN D3) 125 MCG
1 CAPSULE ORAL
Qty: 0 | Refills: 0 | DISCHARGE

## 2021-08-06 RX ADMIN — Medication 120 MILLIGRAM(S): at 05:17

## 2021-08-06 RX ADMIN — Medication 25 MILLIGRAM(S): at 05:17

## 2021-08-06 RX ADMIN — Medication 650 MILLIGRAM(S): at 14:30

## 2021-08-06 RX ADMIN — Medication 25 MILLIGRAM(S): at 16:59

## 2021-08-06 RX ADMIN — Medication 650 MILLIGRAM(S): at 15:43

## 2021-08-06 RX ADMIN — POLYETHYLENE GLYCOL 3350 17 GRAM(S): 17 POWDER, FOR SOLUTION ORAL at 05:17

## 2021-08-06 RX ADMIN — POLYETHYLENE GLYCOL 3350 17 GRAM(S): 17 POWDER, FOR SOLUTION ORAL at 16:59

## 2021-08-06 NOTE — PATIENT PROFILE ADULT - MEDICATIONS/VISITS
Body Location Override (Optional): left lateral upper forehead
Which Doctor Performed Mohs? (Optional): Dr. Gaurav Tobias
Year Removed: [address]
no

## 2021-08-06 NOTE — CHART NOTE - NSCHARTNOTEFT_GEN_A_CORE
Chest tube removed bedside without incident.  F/u post-removal CXR.  Cleared for discharge back to assisted living facility if post-removal CXR WNL and absent pneumothorax.  Thoracic Surgery to sign off.

## 2021-08-06 NOTE — CHART NOTE - NSCHARTNOTESELECT_GEN_ALL_CORE
Assessment/Plan:    Diagnoses and all orders for this visit:    Acute pain of left knee  -     Ambulatory referral to Orthopedic Surgery  -     MRI knee left  wo contrast; Future    Laxity of knee joint, left  -     MRI knee left  wo contrast; Future    + Lachmans test   I would like to obtain an MRI of the left knee to evaluate for 6 weeks of left knee pain and feeling of laxity after jumping off of his truck  Since that time is been experiencing pain which has been limiting him in his activities of daily living and occupation as he does perform a lot a walking  He does have a history of an ACL reconstruction with cadaver tissue and it is a possibility that this has ruptured as he does not have a solid endpoint on his left Lachmans test   I would like to obtain the MRI to guide further treatment including possible surgery  He may continue meloxicam and activity modification and rest   There is also concern for possible medial meniscus tear  Return for Follow Up After Imaging Study  Chief Complaint:     Chief Complaint   Patient presents with    Left Knee - Pain       Subjective:   Patient ID: Kay Hoff is a 44 y o  male  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 with swelling the first several weeks which he noted after jogging and jumping off of his truck with a hard landing  Since then he has been unable to run or exercise, and he is having a lot of pain and discomfort at work since he does perform a lot of walking  Denies any painful popping or clicking but does have a new sense of instability  He does have a history of a left ACL reconstruction  He has been taking Meloxicam which does seem to help his pain, and if he misses a dose he does have increased pain  Review of Systems   Constitutional: Negative for chills and fever  HENT: Negative for sore throat and trouble swallowing  Eyes: Negative for pain and discharge     Respiratory: Negative for choking Thoracic Surgery/Event Note and shortness of breath  Cardiovascular: Negative for chest pain and palpitations  Gastrointestinal: Negative for abdominal pain and vomiting  Endocrine: Negative for cold intolerance and heat intolerance  Musculoskeletal: Positive for arthralgias  Neurological: Negative for dizziness and weakness  Psychiatric/Behavioral: Negative for self-injury and suicidal ideas  The following portions of the patient's chart were reviewed and updated as appropriate: Allergy:  No Known Allergies    History reviewed  No pertinent past medical history      Past Surgical History:   Procedure Laterality Date    KNEE SURGERY         Social History     Socioeconomic History    Marital status: Unknown     Spouse name: Not on file    Number of children: Not on file    Years of education: Not on file    Highest education level: Not on file   Occupational History    Not on file   Social Needs    Financial resource strain: Not on file    Food insecurity:     Worry: Not on file     Inability: Not on file    Transportation needs:     Medical: Not on file     Non-medical: Not on file   Tobacco Use    Smoking status: Current Some Day Smoker    Smokeless tobacco: Never Used    Tobacco comment: Occasional cigar   Substance and Sexual Activity    Alcohol use: Yes     Comment: Social     Drug use: Not Currently    Sexual activity: Not on file   Lifestyle    Physical activity:     Days per week: Not on file     Minutes per session: Not on file    Stress: Not on file   Relationships    Social connections:     Talks on phone: Not on file     Gets together: Not on file     Attends [employer] service: Not on file     Active member of club or organization: Not on file     Attends meetings of clubs or organizations: Not on file     Relationship status: Not on file    Intimate partner violence:     Fear of current or ex partner: Not on file     Emotionally abused: Not on file     Physically abused: Not on file Forced sexual activity: Not on file   Other Topics Concern    Not on file   Social History Narrative    Not on file       Family History   Problem Relation Age of Onset    No Known Problems Mother     Cancer Family     Heart disease Family     Hypertension Family        Medications:    Current Outpatient Medications:     amphetamine-dextroamphetamine (ADDERALL) 20 mg tablet, Take 1 tablet (20 mg total) by mouth 2 (two) times a dayMax Daily Amount: 40 mg (Patient taking differently: Take 1 tablet by mouth as needed ), Disp: 60 tablet, Rfl: 0    meloxicam (MOBIC) 15 mg tablet, Take 1 tablet (15 mg total) by mouth daily, Disp: 30 tablet, Rfl: 1    Patient Active Problem List   Diagnosis    Attention deficit hyperactivity disorder (ADHD), predominantly inattentive type    Pain in the coccyx    Acute meniscal tear of left knee       Objective:  Right Knee Exam     Range of Motion   The patient has normal right knee ROM  Tests   Lachman:  Anterior - negative        Other   Effusion: no effusion present      Left Knee Exam     Tenderness   The patient is experiencing tenderness in the medial joint line and patella  Range of Motion   The patient has normal left knee ROM  Tests   Marium:  Medial - positive Lateral - negative  Varus: negative Valgus: negative  Lachman:  Anterior - positive      Drawer:  Anterior - negative     Posterior - negative  Patellar apprehension: negative    Other   Erythema: absent  Sensation: normal  Pulse: present  Swelling: mild  Effusion: effusion present          Observations   Left Knee   Positive for effusion  Right Knee   Negative for effusion  Physical Exam   Musculoskeletal:        Right knee: He exhibits no effusion  Left knee: He exhibits effusion  Neurologic Exam    Procedures    I have personally reviewed the written report of the pertinent studies

## 2021-08-06 NOTE — PROGRESS NOTE ADULT - PROBLEM SELECTOR PLAN 1
CT chest with moderate to large pleural effusion on right   RT CT placed with 700cc serous drainage.  Maintain CT to WS  Monitor outputs.  CXR without pneumothorax. Marked improvement in pleural effusion.   CXR daily while chest tube is in place.  Possibly d/c tube later today if minimal further drainage.   If chest tube is removed, thoracic surgery will sign off and patient will need to follow up as outpatient for cytopathology results.     Plan discussed with Dr. Chatman.

## 2021-08-06 NOTE — PROCEDURE NOTE - NSINFORMCONSENT_GEN_A_CORE
Telephone consent from HCP Leonard Oliveros, and Sheridan Oliveros/Benefits, risks, and possible complications of procedure explained to patient/caregiver who verbalized understanding and gave verbal consent.

## 2021-08-07 ENCOUNTER — TRANSCRIPTION ENCOUNTER (OUTPATIENT)
Age: 86
End: 2021-08-07

## 2021-08-07 LAB
ANION GAP SERPL CALC-SCNC: 11 MMOL/L — SIGNIFICANT CHANGE UP (ref 5–17)
BUN SERPL-MCNC: 12.4 MG/DL — SIGNIFICANT CHANGE UP (ref 8–20)
CALCIUM SERPL-MCNC: 9.3 MG/DL — SIGNIFICANT CHANGE UP (ref 8.6–10.2)
CHLORIDE SERPL-SCNC: 102 MMOL/L — SIGNIFICANT CHANGE UP (ref 98–107)
CO2 SERPL-SCNC: 24 MMOL/L — SIGNIFICANT CHANGE UP (ref 22–29)
CREAT SERPL-MCNC: 0.53 MG/DL — SIGNIFICANT CHANGE UP (ref 0.5–1.3)
GLUCOSE SERPL-MCNC: 96 MG/DL — SIGNIFICANT CHANGE UP (ref 70–99)
HCT VFR BLD CALC: 35.9 % — SIGNIFICANT CHANGE UP (ref 34.5–45)
HGB BLD-MCNC: 12.4 G/DL — SIGNIFICANT CHANGE UP (ref 11.5–15.5)
LDH SERPL L TO P-CCNC: 170 U/L — SIGNIFICANT CHANGE UP (ref 98–192)
MCHC RBC-ENTMCNC: 31.8 PG — SIGNIFICANT CHANGE UP (ref 27–34)
MCHC RBC-ENTMCNC: 34.5 GM/DL — SIGNIFICANT CHANGE UP (ref 32–36)
MCV RBC AUTO: 92.1 FL — SIGNIFICANT CHANGE UP (ref 80–100)
PLATELET # BLD AUTO: 186 K/UL — SIGNIFICANT CHANGE UP (ref 150–400)
POTASSIUM SERPL-MCNC: 3.8 MMOL/L — SIGNIFICANT CHANGE UP (ref 3.5–5.3)
POTASSIUM SERPL-SCNC: 3.8 MMOL/L — SIGNIFICANT CHANGE UP (ref 3.5–5.3)
RBC # BLD: 3.9 M/UL — SIGNIFICANT CHANGE UP (ref 3.8–5.2)
RBC # FLD: 13.6 % — SIGNIFICANT CHANGE UP (ref 10.3–14.5)
SODIUM SERPL-SCNC: 136 MMOL/L — SIGNIFICANT CHANGE UP (ref 135–145)
WBC # BLD: 5.93 K/UL — SIGNIFICANT CHANGE UP (ref 3.8–10.5)
WBC # FLD AUTO: 5.93 K/UL — SIGNIFICANT CHANGE UP (ref 3.8–10.5)

## 2021-08-07 PROCEDURE — 71045 X-RAY EXAM CHEST 1 VIEW: CPT | Mod: 26

## 2021-08-07 PROCEDURE — 99221 1ST HOSP IP/OBS SF/LOW 40: CPT

## 2021-08-07 PROCEDURE — 99232 SBSQ HOSP IP/OBS MODERATE 35: CPT

## 2021-08-07 PROCEDURE — 73060 X-RAY EXAM OF HUMERUS: CPT | Mod: 26,RT

## 2021-08-07 RX ADMIN — APIXABAN 2.5 MILLIGRAM(S): 2.5 TABLET, FILM COATED ORAL at 17:36

## 2021-08-07 RX ADMIN — Medication 25 MILLIGRAM(S): at 17:20

## 2021-08-07 RX ADMIN — POLYETHYLENE GLYCOL 3350 17 GRAM(S): 17 POWDER, FOR SOLUTION ORAL at 17:20

## 2021-08-07 NOTE — DISCHARGE NOTE PROVIDER - NSDCFUADDAPPT_GEN_ALL_CORE_FT
Ortho recommendations:  Sling or van brace as needed for comfort  Weight bear as tolerated right upper extremity  Follow-up with Dr. Mendoza as needed

## 2021-08-07 NOTE — CHART NOTE - NSCHARTNOTEFT_GEN_A_CORE
CXR reviewed, f/u s/p CT removal - no PTX noted.  However, noted displaced R humeral neck fx.  Reviewed prior chest imaging and radiology findings do suggest fx is chronic.  Do not see any mention of chronic humeral neck fx in PMH or in transfer papers from assisted living    Questioned patient who does report she injured that arm a few mos ago while in [place], does not recall ever being told it was fractured. She does recall decision that there was "nothing to do" for the right arm. She does notice RUE weakness and does still use that arm for repositioning and transfers.    At this time patient is in bed, appears in NAD.  R arm being held up against torso and humerus does appear shortened compared to left. Obvious step off deformity on exam and some tenderness on palpation but not significant per patient.   Patient made aware of fracture.     Consider ortho eval for wt bearing status RUE and OT eval.

## 2021-08-07 NOTE — DISCHARGE NOTE PROVIDER - CARE PROVIDER_API CALL
Masood Mendoza)  Orthopaedic Surgery  [address] [place] [address]  Phone: [phone]  Fax: [phone]  Follow Up Time:

## 2021-08-07 NOTE — DISCHARGE NOTE PROVIDER - HOSPITAL COURSE
89y/oF PMH afib on Eliquis, CHF, HTN from [facility] s/p fall. Pt recently moved from [place]. Found to have R pleural effusion, spiculated mass. Seen by CTSx, s/p thoracentesis. Additionally noted with chronic displaced humeral fx, ortho consulted, rec WBAT, can cont brace or sling for comfort. 89y/oF PMH afib on Eliquis, CHF, HTN from [facility] s/p fall. Pt recently moved from [place]. Found to have R pleural effusion, spiculated mass. Seen by CTSx, s/p thoracentesis. Additionally noted with chronic displaced humeral fx, ortho consulted, rec WBAT, can cont brace or sling for comfort. HR elevated 8/8, though regular meds were held for soft BP. asymptomatic. Holding parameters adjusted, HR improved. PT eval rec [facility]. Pt stable for dc to [facility]     Vital Signs Last 24 Hrs  T(C): 36.8 (08 Aug 2021 04:52), Max: 36.8 (07 Aug 2021 21:23)  T(F): 98.3 (08 Aug 2021 04:52), Max: 98.3 (07 Aug 2021 21:23)  HR: 140 (08 Aug 2021 09:53) (97 - 140)  BP: 127/75 (08 Aug 2021 09:53) (105/72 - 127/75)  BP(mean): --  RR: 18 (08 Aug 2021 04:52) (18 - 18)  SpO2: 93% (08 Aug 2021 04:52) (93% - 97%)    GENERAL: NAD  HEENT: PERRL, +EOMI  NECK: soft, supple  CHEST/LUNG: decreased breath sounds bases; respirations unlabored   HEART: S1S2+, Regular rate and rhythm  ABDOMEN: Soft, Nontender, Nondistended; Bowel sounds present  SKIN: warm, dry  MSK: RUE deformity, limited ROM  NEURO: AAOX3  PSYCH: normal affect     34minutes spent on discharge

## 2021-08-07 NOTE — CONSULT NOTE ADULT - REASON FOR ADMISSION
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

## 2021-08-07 NOTE — DISCHARGE NOTE PROVIDER - NSDCMRMEDTOKEN_GEN_ALL_CORE_FT
cholecalciferol 25 mcg (1000 intl units) oral tablet: 1 tab(s) orally once a day  dilTIAZem 60 mg/12 hours oral capsule, extended release: 1 cap(s) orally every 12 hours  Eliquis 2.5 mg oral tablet: 1 tab(s) orally once a day  Floranex oral tablet: 2 tab(s) orally once a day  fluocinonide 0.05% topical cream: Apply topically to affected area prn  metoprolol tartrate 25 mg oral tablet: 1 tab(s) orally 2 times a day  MiraLax oral powder for reconstitution: 17 gram(s) orally 2 times a day   apixaban 2.5 mg oral tablet: 1 tab(s) orally every 12 hours  cholecalciferol 25 mcg (1000 intl units) oral tablet: 1 tab(s) orally once a day  dilTIAZem 120 mg/24 hours oral capsule, extended release: 1 cap(s) orally once a day  Floranex oral tablet: 2 tab(s) orally once a day  fluocinonide 0.05% topical cream: Apply topically to affected area prn  metoprolol tartrate 25 mg oral tablet: 1 tab(s) orally 2 times a day  MiraLax oral powder for reconstitution: 17 gram(s) orally 2 times a day

## 2021-08-07 NOTE — CONSULT NOTE ADULT - ATTENDING COMMENTS
Orthopaedic Trauma Surgeon Addendum:    I have personally performed a face-to-face diagnostic evaluation on this patient.  I have reviewed the physician assistant note and agree with the history, exam, and plan of care, except as noted.    No ortho intervention needed at this time. No follow up required.     Masood Mendoza MD  Orthopaedic Trauma Surgeon  [facility]

## 2021-08-07 NOTE — CONSULT NOTE ADULT - SUBJECTIVE AND OBJECTIVE BOX
Patient is a 89y RHD Female admitted after falling a hitting her head 8/5/21. Patient was noted to have a fracture patter of right humerus on chest xray. Ortho surgery consulted to evaluate. Patient states she orignally hurt the right arm after falling in the bathroom slipping on tile and landing on a half wall sometime around the new year 1/21 as per family who is present at bedside. Patient denies numbness and tingling and states for the most part she has adapted and is able to continue performing activities of daily living however once in a while the arm gets very weak and she has to avoid using it. Patient denies pain. Patient has been using a Jones brace and states she has seen an orthopedic in the past for this who told her she was not a candidate for surgery.    REVIEW OF SYSTEMS  General:	denies fever, chills    Skin/Breast: denies rashes  	  Respiratory and Thorax: denies SOB  	  Cardiovascular:	 denies CP    Gastrointestinal:	 denies abdomina pain    Musculoskeletal:	 denies right arm pain    Neurological:	denies paresthesias    PAST MEDICAL & SURGICAL HISTORY:  Hypertension, unspecified type    Congestive heart failure, unspecified HF chronicity, unspecified heart failure type    Atrial fibrillation, unspecified type    No significant past surgical history    Allergies: No Known Allergies    Medications: see med rec    FAMILY HISTORY:  : non-contributory    Social History: lives at [facility]  ETOH: denies  Smoking: denies    Ambulation: wheelchair/walker (rare)                          12.4   5.93  )-----------( 186      ( 07 Aug 2021 10:49 )             35.9     08-07    136  |  102  |  12.4  ----------------------------<  96  3.8   |  24.0  |  0.53    Ca    9.3      07 Aug 2021 10:49    TPro  6.1<L>  /  Alb  3.4  /  TBili  0.8  /  DBili  x   /  AST  21  /  ALT  9   /  AlkPhos  62  08-06      PHYSICAL EXAM:    Vital Signs Last 24 Hrs  T(C): 36.7 (07 Aug 2021 12:11), Max: 37.1 (07 Aug 2021 04:05)  T(F): 98 (07 Aug 2021 12:11), Max: 98.8 (07 Aug 2021 04:05)  HR: 97 (07 Aug 2021 12:11) (74 - 100)  BP: 109/74 (07 Aug 2021 12:11) (102/69 - 122/76)  BP(mean): --  RR: 18 (07 Aug 2021 12:11) (18 - 20)  SpO2: 97% (07 Aug 2021 12:11) (67% - 97%)    Appearance: Alert, responsive, in no acute distress.  Right UE: Skin intact, grossly NT, palpable defect at proximal humerus  ROM limited abduction 45 degrees with significant hiking of shoulder girdle  AIN/PIN/intrinsics intact  SILT Rad/med/uln distrib. 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  ·	WBAT  ·	F/u with Dr. VALENTINE Mendoza outpatient as needed Patient is a 89y RHD Female admitted after falling a hitting her head 8/5/21. Patient was noted to have a fracture of right humerus on chest xray. Ortho surgery consulted to evaluate. Patient states she orignally hurt the right arm after falling in the bathroom slipping on tile and landing on a half wall sometime around the new year 1/21 as per family who is present at bedside. Patient denies numbness and tingling and states for the most part she has adapted and is able to continue performing activities of daily living however once in a while the arm gets very weak and she has to avoid using it. Patient denies pain. Patient has been using a Jones brace and states she has seen an orthopedic in the past for this who told her she was not a candidate for surgery.    REVIEW OF SYSTEMS  General:	denies fever, chills    Skin/Breast: denies rashes  	  Respiratory and Thorax: denies SOB  	  Cardiovascular:	 denies CP    Gastrointestinal:	 denies abdomina pain    Musculoskeletal:	 denies right arm pain    Neurological:	denies paresthesias    PAST MEDICAL & SURGICAL HISTORY:  Hypertension, unspecified type    Congestive heart failure, unspecified HF chronicity, unspecified heart failure type    Atrial fibrillation, unspecified type    No significant past surgical history    Allergies: No Known Allergies    Medications: see med rec    FAMILY HISTORY:  : non-contributory    Social History: lives at [facility]  ETOH: denies  Smoking: denies    Ambulation: wheelchair/walker (rare)                          12.4   5.93  )-----------( 186      ( 07 Aug 2021 10:49 )             35.9     08-07    136  |  102  |  12.4  ----------------------------<  96  3.8   |  24.0  |  0.53    Ca    9.3      07 Aug 2021 10:49    TPro  6.1<L>  /  Alb  3.4  /  TBili  0.8  /  DBili  x   /  AST  21  /  ALT  9   /  AlkPhos  62  08-06      PHYSICAL EXAM:    Vital Signs Last 24 Hrs  T(C): 36.7 (07 Aug 2021 12:11), Max: 37.1 (07 Aug 2021 04:05)  T(F): 98 (07 Aug 2021 12:11), Max: 98.8 (07 Aug 2021 04:05)  HR: 97 (07 Aug 2021 12:11) (74 - 100)  BP: 109/74 (07 Aug 2021 12:11) (102/69 - 122/76)  BP(mean): --  RR: 18 (07 Aug 2021 12:11) (18 - 20)  SpO2: 97% (07 Aug 2021 12:11) (67% - 97%)    Appearance: Alert, responsive, in no acute distress.  Right UE: Skin intact, grossly NT, palpable defect at proximal humerus  ROM limited abduction 45 degrees with significant hiking of shoulder girdle  AIN/PIN/intrinsics intact  SILT Rad/med/uln distrib. 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  ·	WBAT  ·	F/u with Dr. VALENTINE Mendoza outpatient as needed

## 2021-08-07 NOTE — DISCHARGE NOTE PROVIDER - NSDCCPCAREPLAN_GEN_ALL_CORE_FT
PRINCIPAL DISCHARGE DIAGNOSIS  Diagnosis: Pleural effusion  Assessment and Plan of Treatment:       SECONDARY DISCHARGE DIAGNOSES  Diagnosis: H/O nonunion of fracture  Assessment and Plan of Treatment:

## 2021-08-08 ENCOUNTER — TRANSCRIPTION ENCOUNTER (OUTPATIENT)
Age: 86
End: 2021-08-08

## 2021-08-08 VITALS
DIASTOLIC BLOOD PRESSURE: 56 MMHG | HEART RATE: 97 BPM | RESPIRATION RATE: 18 BRPM | SYSTOLIC BLOOD PRESSURE: 127 MMHG | OXYGEN SATURATION: 95 % | TEMPERATURE: 99 F

## 2021-08-08 PROCEDURE — 82945 GLUCOSE OTHER FLUID: CPT

## 2021-08-08 PROCEDURE — 73060 X-RAY EXAM OF HUMERUS: CPT

## 2021-08-08 PROCEDURE — 88112 CYTOPATH CELL ENHANCE TECH: CPT

## 2021-08-08 PROCEDURE — 87075 CULTR BACTERIA EXCEPT BLOOD: CPT

## 2021-08-08 PROCEDURE — 72125 CT NECK SPINE W/O DYE: CPT

## 2021-08-08 PROCEDURE — 71045 X-RAY EXAM CHEST 1 VIEW: CPT

## 2021-08-08 PROCEDURE — 99285 EMERGENCY DEPT VISIT HI MDM: CPT | Mod: 25

## 2021-08-08 PROCEDURE — C1729: CPT

## 2021-08-08 PROCEDURE — 89051 BODY FLUID CELL COUNT: CPT

## 2021-08-08 PROCEDURE — U0003: CPT

## 2021-08-08 PROCEDURE — 80048 BASIC METABOLIC PNL TOTAL CA: CPT

## 2021-08-08 PROCEDURE — U0005: CPT

## 2021-08-08 PROCEDURE — 85610 PROTHROMBIN TIME: CPT

## 2021-08-08 PROCEDURE — 82042 OTHER SOURCE ALBUMIN QUAN EA: CPT

## 2021-08-08 PROCEDURE — 87102 FUNGUS ISOLATION CULTURE: CPT

## 2021-08-08 PROCEDURE — 84484 ASSAY OF TROPONIN QUANT: CPT

## 2021-08-08 PROCEDURE — 87205 SMEAR GRAM STAIN: CPT

## 2021-08-08 PROCEDURE — 97163 PT EVAL HIGH COMPLEX 45 MIN: CPT

## 2021-08-08 PROCEDURE — 86769 SARS-COV-2 COVID-19 ANTIBODY: CPT

## 2021-08-08 PROCEDURE — 80061 LIPID PANEL: CPT

## 2021-08-08 PROCEDURE — 96374 THER/PROPH/DIAG INJ IV PUSH: CPT

## 2021-08-08 PROCEDURE — 83036 HEMOGLOBIN GLYCOSYLATED A1C: CPT

## 2021-08-08 PROCEDURE — G1004: CPT

## 2021-08-08 PROCEDURE — 85027 COMPLETE CBC AUTOMATED: CPT

## 2021-08-08 PROCEDURE — 99239 HOSP IP/OBS DSCHRG MGMT >30: CPT

## 2021-08-08 PROCEDURE — 85025 COMPLETE CBC W/AUTO DIFF WBC: CPT

## 2021-08-08 PROCEDURE — 36415 COLL VENOUS BLD VENIPUNCTURE: CPT

## 2021-08-08 PROCEDURE — 88305 TISSUE EXAM BY PATHOLOGIST: CPT

## 2021-08-08 PROCEDURE — 80053 COMPREHEN METABOLIC PANEL: CPT

## 2021-08-08 PROCEDURE — 84443 ASSAY THYROID STIM HORMONE: CPT

## 2021-08-08 PROCEDURE — 83986 ASSAY PH BODY FLUID NOS: CPT

## 2021-08-08 PROCEDURE — 70450 CT HEAD/BRAIN W/O DYE: CPT

## 2021-08-08 PROCEDURE — 83880 ASSAY OF NATRIURETIC PEPTIDE: CPT

## 2021-08-08 PROCEDURE — 87070 CULTURE OTHR SPECIMN AEROBIC: CPT

## 2021-08-08 PROCEDURE — 83615 LACTATE (LD) (LDH) ENZYME: CPT

## 2021-08-08 PROCEDURE — 93005 ELECTROCARDIOGRAM TRACING: CPT

## 2021-08-08 PROCEDURE — 71260 CT THORAX DX C+: CPT | Mod: MG

## 2021-08-08 PROCEDURE — 84157 ASSAY OF PROTEIN OTHER: CPT

## 2021-08-08 RX ORDER — APIXABAN 2.5 MG/1
1 TABLET, FILM COATED ORAL
Qty: 0 | Refills: 0 | DISCHARGE

## 2021-08-08 RX ORDER — DILTIAZEM HCL 120 MG
1 CAPSULE, EXT RELEASE 24 HR ORAL
Qty: 0 | Refills: 0 | DISCHARGE
Start: 2021-08-08

## 2021-08-08 RX ORDER — METOPROLOL TARTRATE 50 MG
25 TABLET ORAL EVERY 12 HOURS
Refills: 0 | Status: DISCONTINUED | OUTPATIENT
Start: 2021-08-08 | End: 2021-08-08

## 2021-08-08 RX ORDER — DILTIAZEM HCL 120 MG
120 CAPSULE, EXT RELEASE 24 HR ORAL DAILY
Refills: 0 | Status: DISCONTINUED | OUTPATIENT
Start: 2021-08-08 | End: 2021-08-08

## 2021-08-08 RX ORDER — DILTIAZEM HCL 120 MG
5 CAPSULE, EXT RELEASE 24 HR ORAL ONCE
Refills: 0 | Status: DISCONTINUED | OUTPATIENT
Start: 2021-08-08 | End: 2021-08-08

## 2021-08-08 RX ORDER — DILTIAZEM HCL 120 MG
1 CAPSULE, EXT RELEASE 24 HR ORAL
Qty: 0 | Refills: 0 | DISCHARGE

## 2021-08-08 RX ORDER — APIXABAN 2.5 MG/1
1 TABLET, FILM COATED ORAL
Qty: 0 | Refills: 0 | DISCHARGE
Start: 2021-08-08

## 2021-08-08 RX ADMIN — APIXABAN 2.5 MILLIGRAM(S): 2.5 TABLET, FILM COATED ORAL at 05:31

## 2021-08-08 RX ADMIN — Medication 120 MILLIGRAM(S): at 11:06

## 2021-08-08 RX ADMIN — POLYETHYLENE GLYCOL 3350 17 GRAM(S): 17 POWDER, FOR SOLUTION ORAL at 05:31

## 2021-08-08 NOTE — DISCHARGE NOTE NURSING/CASE MANAGEMENT/SOCIAL WORK - PATIENT PORTAL LINK FT
You can access the FollowMyHealth Patient Portal offered by  by registering at the following website: [email or web address]. By joining [employer]’s FollowMyHealth portal, you will also be able to view your health information using other applications (apps) compatible with our system.

## 2021-08-08 NOTE — PROGRESS NOTE ADULT - REASON FOR ADMISSION
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

## 2021-08-08 NOTE — PHYSICAL THERAPY INITIAL EVALUATION ADULT - ADDITIONAL COMMENTS
pt reports she lives in a senior complex ( [facility] ) and recently has a roommate. pt states she needs help with ADLs, but is often able to dress herself. pt states she primarily uses a wheelchair, but can ambulate small distances. pt states she has fallen 3x in the last 3 months.

## 2021-08-08 NOTE — PHYSICAL THERAPY INITIAL EVALUATION ADULT - NEUROVASCULAR ASSESSMENT LUE
pt states she sometimes has numbness on the tip of her 3rd digit./warm/no numbness/no tingling/no discoloration

## 2021-08-08 NOTE — PHYSICAL THERAPY INITIAL EVALUATION ADULT - GENERAL OBSERVATIONS, REHAB EVAL
pt received semi mendoza in bed + monitor + primafit, A&OX4, NAD and willing to participate with PT.

## 2021-08-08 NOTE — PROGRESS NOTE ADULT - ASSESSMENT
patient is 89 year old female w AFIB on eliquis, CHF, HTN arrives from [facility]  to ED by ambulance after fall , hitting head. patient recently moved from [place] to be close to her family and moved to [place]. Pt fell out of wheelchair striking L cheek and head while  she was getting into her wheelchair, patient fell  hitting her head on dresser, denies any  LOC. in ed  patient  had CT head and Cspine no acute changes remodeling of post rib #2 , #3 and scapula; thyroid nodule 1.9 and large R pleural effusion. CT chest showed rt sided large plural effusion and spiculated mass. findings discussed with patient and son and daughter in law at bedside. patient is ok to be admitted for fluid drainage but doesnot want to stay longer for test results. patient on eliquis , last dose taken last 8/4 pm . patient denies any sob , cp, dizziness.      > Large R pleural effusion/ spiculated mass   -  monitor O2 sat  - ct sx consulted , plan for possible  thoracentesis diagnostic and therapeutic once patient and family decide   - hold eliquis today ,  can resume day after thoracocentesis     > chronic AFIB  - c/w  monitor on telemetry  - c/w bb , ccb   - hold eliquis today , resume day after thoracentesis     >Mechanical fall: initial encounter/ Soft tissue injury to head and face  - ct head/ neck neg   - pt / ot eval     > dvt ppx : scds , resume eliquis in am . plan for dc back to [place] once medically stable       
89y/oF PMH afib on Eliquis, CHF, HTN from [facility] s/p fall. Pt recently moved from [place]. Found to have R pleural effusion, spiculated mass     R pleural effusion  Spiculated mass   -s/p thoracentesis   -f/u repeat cxr     Chronic afib   -cont eliquis   -cont bb, ccb     s/p mechanical fall   Chronic nonunion proximal humerus fx   -CT head/neck neg for acute pathology   -ortho consult appreciated   -wbat   -cont van brace or sling for comfort   -f/u Dr Kelly murguia   -PT/OT evals pending 
89y/oF PMH afib on Eliquis, CHF, HTN from [facility] s/p fall. Pt recently moved from [place]. Found to have R pleural effusion, spiculated mass     R pleural effusion  Spiculated mass   -s/p thoracentesis   -f/u repeat cxr     Chronic afib   -cont eliquis   -cont bb, ccb     s/p mechanical fall   Chronic nonunion proximal humerus fx   -CT head/neck neg for acute pathology   -ortho consult appreciated   -wbat   -cont van brace or sling for comfort   -f/u Dr Kelly riverapt   -PT eval: ke   
89 year old female with PMH of AFIB on Eliquis CHF, HTN arrives from [facility]  to ED by ambulance after fall , hitting head after a mechanical fall.  Reported a fall in [place] 2 months ago from wheelchair w Right leg soft tissue injury. CT surgery was consulted after CT chest revealed right pleural effusion.

## 2021-08-08 NOTE — PHYSICAL THERAPY INITIAL EVALUATION ADULT - PERTINENT HX OF CURRENT PROBLEM, REHAB EVAL
89y/oF PMH afib on Eliquis, CHF, HTN from [facility] s/p fall. Pt recently moved from [place]. Found to have R pleural effusion, spiculated mass

## 2021-08-08 NOTE — PROGRESS NOTE ADULT - SUBJECTIVE AND OBJECTIVE BOX
ABRAHAM OLIVER    [id number]    89y      Female    CC: pleural effusion     INTERVAL HPI/OVERNIGHT EVENTS: pt seen and examined.     REVIEW OF SYSTEMS:    CONSTITUTIONAL: No fever, weight loss  RESPIRATORY: No cough, wheezing, hemoptysis; No shortness of breath  CARDIOVASCULAR: No chest pain, palpitations  GASTROINTESTINAL: No abdominal or epigastric pain. No nausea, vomiting  NEUROLOGICAL: No headaches    Vital Signs Last 24 Hrs  T(C): 36.7 (07 Aug 2021 12:11), Max: 37.1 (07 Aug 2021 04:05)  T(F): 98 (07 Aug 2021 12:11), Max: 98.8 (07 Aug 2021 04:05)  HR: 97 (07 Aug 2021 12:11) (74 - 97)  BP: 109/74 (07 Aug 2021 12:11) (102/69 - 122/76)  BP(mean): --  RR: 18 (07 Aug 2021 12:11) (18 - 20)  SpO2: 97% (07 Aug 2021 12:11) (67% - 97%)    PHYSICAL EXAM:    GENERAL: NAD  HEENT: PERRL, +EOMI  NECK: soft, supple  CHEST/LUNG: decreased breath sounds bases; respirations unlabored   HEART: S1S2+, Regular rate and rhythm  ABDOMEN: Soft, Nontender, Nondistended; Bowel sounds present  SKIN: warm, dry  MSK: RUE deformity, limited ROM  NEURO: AAOX3  PSYCH: normal affect     LABS:                        12.4   5.93  )-----------( 186      ( 07 Aug 2021 10:49 )             35.9     08-07    136  |  102  |  12.4  ----------------------------<  96  3.8   |  24.0  |  0.53    Ca    9.3      07 Aug 2021 10:49    TPro  6.1<L>  /  Alb  3.4  /  TBili  0.8  /  DBili  x   /  AST  21  /  ALT  9   /  AlkPhos  62  08-06    PT/INR - ( 06 Aug 2021 03:59 )   PT: 15.0 sec;   INR: 1.31 ratio                 MEDICATIONS  (STANDING):  apixaban 2.5 milliGRAM(s) Oral every 12 hours  diltiazem    milliGRAM(s) Oral daily  lidocaine 1% (Preservative-free) Injectable 20 milliLiter(s) Local Injection once  metoprolol tartrate 25 milliGRAM(s) Oral every 12 hours  polyethylene glycol 3350 17 Gram(s) Oral two times a day    MEDICATIONS  (PRN):  acetaminophen   Tablet .. 650 milliGRAM(s) Oral every 6 hours PRN Temp greater or equal to 38.5C (101.3F), Mild Pain (1 - 3)  aluminum hydroxide/magnesium hydroxide/simethicone Suspension 30 milliLiter(s) Oral every 4 hours PRN Dyspepsia  melatonin 3 milliGRAM(s) Oral at bedtime PRN Insomnia      RADIOLOGY & ADDITIONAL TESTS:  
cc: pleural effusion       interval hx : patient seen and eval. awak e, [place] , comfortable. , in no acute distress. anxious about having fluid removed, not sure about the procedure but wants to speak to her family.   denies sob, cp , dizziness     Vital Signs Last 24 Hrs  T(C): 36.6 (06 Aug 2021 11:26), Max: 37.1 (06 Aug 2021 05:00)  T(F): 97.8 (06 Aug 2021 11:26), Max: 98.8 (06 Aug 2021 05:00)  HR: 87 (06 Aug 2021 11:26) (87 - 118)  BP: 126/58 (06 Aug 2021 11:26) (117/75 - 126/58)  BP(mean): --  RR: 19 (06 Aug 2021 11:26) (18 - 19)  SpO2: 96% (06 Aug 2021 11:26) (95% - 96%)    gen : awake , alert , x 4 , hog , comfortable.  heen t: left cheek scab noted , dried blood   cvs : s1,s2, rrr   resp : diminished bs on rt side. good air entry left lung   abd: soft , nt , bs nl   ext : no edema b/l   neuro: non focal                            11.8   5.19  )-----------( 183      ( 06 Aug 2021 03:59 )             34.8   08-06    134<L>  |  102  |  13.8  ----------------------------<  84  3.7   |  20.0<L>  |  0.52    Ca    9.1      06 Aug 2021 03:59    TPro  6.1<L>  /  Alb  3.4  /  TBili  0.8  /  DBili  x   /  AST  21  /  ALT  9   /  AlkPhos  62  08-06  
ABRAHAM OLIVER    [id number]    89y      Female    CC: pleural effusion    INTERVAL HPI/OVERNIGHT EVENTS: pt seen and examined. elevated hr in am. meds held for borderline bp, holding parameters adjusted. asymptomatic     REVIEW OF SYSTEMS:    CONSTITUTIONAL: No fever, weight loss  RESPIRATORY: No cough, wheezing, hemoptysis; No shortness of breath  CARDIOVASCULAR: No chest pain, palpitations  GASTROINTESTINAL: No abdominal or epigastric pain. No nausea, vomiting  NEUROLOGICAL: No headaches    Vital Signs Last 24 Hrs  T(C): 37.3 (08 Aug 2021 12:15), Max: 37.3 (08 Aug 2021 12:15)  T(F): 99.2 (08 Aug 2021 12:15), Max: 99.2 (08 Aug 2021 12:15)  HR: 101 (08 Aug 2021 12:15) (97 - 140)  BP: 124/64 (08 Aug 2021 12:15) (105/72 - 127/75)  BP(mean): --  RR: 20 (08 Aug 2021 12:15) (18 - 20)  SpO2: 94% (08 Aug 2021 12:15) (93% - 96%)    PHYSICAL EXAM:    GENERAL: NAD  HEENT: PERRL, +EOMI  NECK: soft, supple  CHEST/LUNG: decreased breath sounds bases; respirations unlabored   HEART: S1S2+, Regular rate and rhythm  ABDOMEN: Soft, Nontender, Nondistended; Bowel sounds present  SKIN: warm, dry  MSK: RUE deformity, limited ROM  NEURO: AAOX3  PSYCH: normal affect     LABS:                        12.4   5.93  )-----------( 186      ( 07 Aug 2021 10:49 )             35.9     08-07    136  |  102  |  12.4  ----------------------------<  96  3.8   |  24.0  |  0.53    Ca    9.3      07 Aug 2021 10:49          MEDICATIONS  (STANDING):  apixaban 2.5 milliGRAM(s) Oral every 12 hours  diltiazem    milliGRAM(s) Oral daily  diltiazem Injectable 5 milliGRAM(s) IV Push once  lidocaine 1% (Preservative-free) Injectable 20 milliLiter(s) Local Injection once  metoprolol tartrate 25 milliGRAM(s) Oral every 12 hours  polyethylene glycol 3350 17 Gram(s) Oral two times a day    MEDICATIONS  (PRN):  acetaminophen   Tablet .. 650 milliGRAM(s) Oral every 6 hours PRN Temp greater or equal to 38.5C (101.3F), Mild Pain (1 - 3)  aluminum hydroxide/magnesium hydroxide/simethicone Suspension 30 milliLiter(s) Oral every 4 hours PRN Dyspepsia  melatonin 3 milliGRAM(s) Oral at bedtime PRN Insomnia      RADIOLOGY & ADDITIONAL TESTS:  
Subjective - patient seen and evaluated bedside. Sitting comfortably in bed. Denies CP, SOB, HA, dizziness, n/v/d    Review of Systems: negative x 10 systems except as noted above    Brief summary:  89yFemale with pleural effusion s/p RT sided PTC placement    Significant/[id number] events: RT sided PTC placement       PAST MEDICAL & SURGICAL HISTORY:  Hypertension, unspecified type    Congestive heart failure, unspecified HF chronicity, unspecified heart failure type    Atrial fibrillation, unspecified type    No significant past surgical history    No significant past surgical history          acetaminophen   Tablet .. 650 milliGRAM(s) Oral every 6 hours PRN  aluminum hydroxide/magnesium hydroxide/simethicone Suspension 30 milliLiter(s) Oral every 4 hours PRN  diltiazem    milliGRAM(s) Oral daily  lidocaine 1% (Preservative-free) Injectable 20 milliLiter(s) Local Injection once  melatonin 3 milliGRAM(s) Oral at bedtime PRN  metoprolol tartrate 25 milliGRAM(s) Oral every 12 hours  polyethylene glycol 3350 17 Gram(s) Oral two times a day  MEDICATIONS  (PRN):  acetaminophen   Tablet .. 650 milliGRAM(s) Oral every 6 hours PRN Temp greater or equal to 38.5C (101.3F), Mild Pain (1 - 3)  aluminum hydroxide/magnesium hydroxide/simethicone Suspension 30 milliLiter(s) Oral every 4 hours PRN Dyspepsia  melatonin 3 milliGRAM(s) Oral at bedtime PRN Insomnia      Daily     Daily                               11.8   5.19  )-----------( 183      ( 06 Aug 2021 03:59 )             34.8   08-06    134<L>  |  102  |  13.8  ----------------------------<  84  3.7   |  20.0<L>  |  0.52    Ca    9.1      06 Aug 2021 03:59    TPro  6.1<L>  /  Alb  3.4  /  TBili  0.8  /  DBili  x   /  AST  21  /  ALT  9   /  AlkPhos  62  08-06    CARDIAC MARKERS ( 06 Aug 2021 03:59 )  x     / <0.01 ng/mL / x     / x     / x        PT/INR - ( 06 Aug 2021 03:59 )   PT: 15.0 sec;   INR: 1.31 ratio               Objective:  T(C): 36.6 (08-06-21 @ 11:26), Max: 37.1 (08-06-21 @ 05:00)  HR: 87 (08-06-21 @ 11:26) (87 - 118)  BP: 126/58 (08-06-21 @ 11:26) (106/61 - 126/58)  RR: 19 (08-06-21 @ 11:26) (18 - 19)  SpO2: 96% (08-06-21 @ 11:26) (95% - 97%)  Wt(kg): --CAPILLARY BLOOD GLUCOSE      I&O's Summary    05 Aug 2021 07:01  -  06 Aug 2021 07:00  --------------------------------------------------------  IN: 0 mL / OUT: 900 mL / NET: -900 mL        Physical Exam  Neuro: A+O x 3, non-focal, speech clear and intact  Pulm: CTA, equal bilaterally  CV: RRR,  +S1S2  Abd: soft, NT, ND, +BS  Ext: +DP Pulses b/l, +PT pulses, no edema  Chest tubes: RT pleural PTC in place with dressing c/d/i, no air leak, draining serous fluid appropriately.     Imaging:  CXR:  < from: Xray Chest 1 View- PORTABLE-Urgent (Xray Chest 1 View- PORTABLE-Urgent .) (08.06.21 @ 13:04) >        INTERPRETATION:  Clinical history: 89-year-old female, pleural effusion, status post chest tube.    Two expiratory/kyphotic views are compared to 7 hours prior and are correlated with the chest CT of 5/8/2021.    FINDINGS: Right chest tube in place with marked improvement in pleural effusion, no pneumothorax.    Cardiac silhouette and pulmonary vasculature are within normal limits with no other acute findings and no change. Chronic right proximal humeral fracture, grossly unchanged.    IMPRESSION:  Right chest tube in place with marked improvement in pleural effusion, no pneumothorax    < end of copied text >

## 2021-08-08 NOTE — PHYSICAL THERAPY INITIAL EVALUATION ADULT - GAIT DISTANCE, PT EVAL
4 feet x 2; pt deferred further amb due to wanting to eat breakfast and stating she does not want to walk far " I'm usually in a wheelchair."

## 2021-08-09 PROBLEM — I48.91 UNSPECIFIED ATRIAL FIBRILLATION: Chronic | Status: ACTIVE | Noted: 2021-08-05

## 2021-08-09 PROBLEM — I50.9 HEART FAILURE, UNSPECIFIED: Chronic | Status: ACTIVE | Noted: 2021-08-05

## 2021-08-09 PROBLEM — I10 ESSENTIAL (PRIMARY) HYPERTENSION: Chronic | Status: ACTIVE | Noted: 2021-08-05

## 2021-08-11 LAB
CULTURE RESULTS: SIGNIFICANT CHANGE UP
SPECIMEN SOURCE: SIGNIFICANT CHANGE UP

## 2021-08-12 ENCOUNTER — TRANSCRIPTION ENCOUNTER (OUTPATIENT)
Age: 86
End: 2021-08-12

## 2021-08-12 LAB — NON-GYNECOLOGICAL CYTOLOGY STUDY: SIGNIFICANT CHANGE UP

## 2021-09-04 LAB
CULTURE RESULTS: SIGNIFICANT CHANGE UP
SPECIMEN SOURCE: SIGNIFICANT CHANGE UP

## 2021-10-25 ENCOUNTER — NON-APPOINTMENT (OUTPATIENT)
Age: 86
End: 2021-10-25

## 2021-10-25 ENCOUNTER — APPOINTMENT (OUTPATIENT)
Dept: ORTHOPEDIC SURGERY | Facility: CLINIC | Age: 86
End: 2021-10-25
Payer: MEDICARE

## 2021-10-25 VITALS — HEIGHT: 61 IN | BODY MASS INDEX: 22.09 KG/M2 | WEIGHT: 117 LBS

## 2021-10-25 DIAGNOSIS — S42.291A OTHER DISPLACED FRACTURE OF UPPER END OF RIGHT HUMERUS, INITIAL ENCOUNTER FOR CLOSED FRACTURE: ICD-10-CM

## 2021-10-25 PROCEDURE — 73030 X-RAY EXAM OF SHOULDER: CPT | Mod: RT

## 2021-10-25 PROCEDURE — 99203 OFFICE O/P NEW LOW 30 MIN: CPT

## 2021-10-25 NOTE — DISCUSSION/SUMMARY
[de-identified] : The underlying pathophysiology was reviewed in great detail with the patient as well as the various treatment options, including ice, analgesics, NSAIDs, Physical therapy, steroid injections.\par \par Humeral fracture brace was reapplied and I demonstrated for the family how to put it on. \par She feels more comfortable in the brace and should wear it as much as possible.\par \par \par FU prn \par \par All questions were answered, all alternatives discussed and the patient is in complete agreement with that plan. Follow-up appointment as instructed. Any issues and the patient will call or come in sooner.\par

## 2021-10-25 NOTE — PHYSICAL EXAM
[de-identified] : Right Upper Extremity\par o Shoulder :\par ¦ Inspection/Palpation :  tenderness over the greater tuberosity, no acromioclavicular joint tenderness,  tenderness anterior and posterior glenohumeral joint,no swelling\par ¦ Range of Motion : ACTIVE FORWARD ELEVATION: Measured at 80 degrees, ACTIVE EXTERNAL ROTATION: Measured at 10 degrees, ACTIVE INTERNAL ROTATION: Measured at GT\par ¦ Strength : not assessed today due to pain. \par ¦ Stability : no joint instability on provocative testing\par ¦ Tests/Signs : not assessed today due to pain. \par o Upper Arm : no tenderness, no swelling, no deformities\par o Muscle Bulk : no atrophy\par o Sensation : sensation intact to light touch\par o Skin : no skin rash or discoloration\par o Vascular Exam : no edema, no cyanosis, radial and ulnar pulses normal\par  [de-identified] : o Right Shoulder : Grashey, Axillary and Outlet views were obtained, there are no soft tissue abnormalities,  alignment is normal, normal appearing joint spaces, normal bone density, no bony lesions, Chronic appearing, overriding and displaced proximal right humeral fracture. Distal fragment is displaced medially. There is 2.5cm displacement of fragments at widest separation. Periosteal new bone and callus partially bridges the gap between the fragments.\par \par  \par Patient comes to today's visit with outside imaging already performed. I reviewed the images in detail with the patient and discussed the findings as highlighted below.\par \par EXAM: XR HUMERUS MIN 2 VIEWS RT\par \par PROCEDURE DATE: 08/07/2021\par \par \par \par INTERPRETATION: DATE OF STUDY: 8/7/21.\par \par COMPARISON: 8/5/21 CT scan of the chest.\par \par CLINICAL HISTORY: Status post fall - assess right humeral fracture.\par \par Technique: 2 right humeral films.\par \par FINDINGS:\par Chronic appearing, overriding and displaced proximal right humeral fracture. Distal fragment is displaced medially. There is 2.5cm displacement of fragments at widest separation. Periosteal new bone and callus partially bridges the gap between the fragments.\par Right glenohumeral and acromioclavicular joints are maintained.\par \par IMPRESSION:\par Chronic appearing, displaced proximal right humeral fracture.\par \par --- End of Report ---\par

## 2021-10-25 NOTE — HISTORY OF PRESENT ILLNESS
[de-identified] : ABRAHAM OLIVER is a 89 year old RHD female presenting to the office complaining of right shoulder pain. He is accompanied to the office by her  daughter who is contributing to her  medical history. Patient reports pain  intermittently for years. She notes a fall on New Years 2019 where she sustained a proximal humerus fracture. Patient denies any recent injury or trauma to the area. The patient describes the pain as a dull aching, and occasionally sharp pain diffusely located to the right shoulder that is intermittent in nature. Her  symptoms are exacerbated with any movement of the shoulder. Patient reports the pain is not waking her up at night.  Patient reports associated weakness. Denies numbness and tingling in the upper extremity.  Patient is taking Tylenol for pain relief with mild to moderate relief in symptoms. \par Patient denies any other complaints at this time.

## 2021-10-26 ENCOUNTER — APPOINTMENT (OUTPATIENT)
Dept: ORTHOPEDIC SURGERY | Facility: CLINIC | Age: 86
End: 2021-10-26

## 2021-11-20 NOTE — ED PROVIDER NOTE - NS ED MD DISPO ADMITTING SERVICE
Called and discussed with patient regarding her symptoms. Patient states that her rashes are faded. Plan: Discontinue Medro DosePak, start Zyrtec 10 mg PO every day and continue with Calamine lotion. Call office if worsen. Patient verbalized understanding. MED

## 2022-02-15 ENCOUNTER — EMERGENCY (EMERGENCY)
Facility: HOSPITAL | Age: 87
LOS: 1 days | Discharge: HOME | End: 2022-02-15
Attending: EMERGENCY MEDICINE
Payer: MEDICARE

## 2022-02-15 VITALS
TEMPERATURE: 98 F | OXYGEN SATURATION: 99 % | RESPIRATION RATE: 16 BRPM | HEART RATE: 78 BPM | WEIGHT: 110.89 LBS | DIASTOLIC BLOOD PRESSURE: 75 MMHG | HEIGHT: 62 IN | SYSTOLIC BLOOD PRESSURE: 138 MMHG

## 2022-02-15 PROCEDURE — 12002 RPR S/N/AX/GEN/TRNK2.6-7.5CM: CPT | Mod: GC

## 2022-02-15 PROCEDURE — 73080 X-RAY EXAM OF ELBOW: CPT | Mod: 26,LT

## 2022-02-15 PROCEDURE — 72125 CT NECK SPINE W/O DYE: CPT | Mod: 26,MA

## 2022-02-15 PROCEDURE — 73130 X-RAY EXAM OF HAND: CPT | Mod: 26,LT

## 2022-02-15 PROCEDURE — 99284 EMERGENCY DEPT VISIT MOD MDM: CPT | Mod: 25,GC

## 2022-02-15 PROCEDURE — 70450 CT HEAD/BRAIN W/O DYE: CPT | Mod: 26,MA

## 2022-02-15 PROCEDURE — 73110 X-RAY EXAM OF WRIST: CPT | Mod: 26,LT

## 2022-02-15 RX ORDER — ACETAMINOPHEN 500 MG
650 TABLET ORAL ONCE
Refills: 0 | Status: COMPLETED | OUTPATIENT
Start: 2022-02-15 | End: 2022-02-15

## 2022-02-15 RX ORDER — TETANUS TOXOID, REDUCED DIPHTHERIA TOXOID AND ACELLULAR PERTUSSIS VACCINE, ADSORBED 5; 2.5; 8; 8; 2.5 [IU]/.5ML; [IU]/.5ML; UG/.5ML; UG/.5ML; UG/.5ML
0.5 SUSPENSION INTRAMUSCULAR ONCE
Refills: 0 | Status: COMPLETED | OUTPATIENT
Start: 2022-02-15 | End: 2022-02-15

## 2022-02-15 NOTE — ED PROVIDER NOTE - ATTENDING CONTRIBUTION TO CARE
89 y/o F, PMH of AFib (on Eliquis), CHF, and HTN, presents to ED from [facility] for mechanical fall with hand injury, imaging ordered, also hit chin, doubt mandible fx, imaging ordered, s/o pending imaging, analgesia, suture repair to hand. pt well appearing, non focal neuro exam.

## 2022-02-15 NOTE — ED PROVIDER NOTE - NSFOLLOWUPINSTRUCTIONS_ED_ALL_ED_FT
To control your pain at home, you should take Ibuprofen 400 mg along with Tylenol 650mg-1000mg every 6 to 8 hours. Limit your maximum daily Tylenol from all sources to 4000mg. Be aware that many other medications contain acetaminophen which is also known as Tylenol. Taking Tylenol and Ibuprofen together has been shown to be more effective at relieving pain than taking them separately.    You should also follow up with orthopedic surgery if pain persists. To control your pain at home, you should take Ibuprofen 400 mg along with Tylenol 650mg-1000mg every 6 to 8 hours. Limit your maximum daily Tylenol from all sources to 4000mg. Be aware that many other medications contain acetaminophen which is also known as Tylenol. Taking Tylenol and Ibuprofen together has been shown to be more effective at relieving pain than taking them separately.    You should also follow up with orthopedic surgery if pain persists.    1. Keep the wound clean and dry for 24 hours then gently rinse the wound daily without scrubbing.  Apply neosporin or bacitracin 1-2 times daily.  Change the dressing daily.    2. Return to ED in 7-10 days for suture removal.     3. Return to the ED with new or worsening symptoms including fever, pus, redness, or uncontrolled pain.

## 2022-02-15 NOTE — ED PROVIDER NOTE - PROGRESS NOTE DETAILS
Cara Moore PGY2: XR shows cortical irregularity at lateral border of distal radius. No point tenderness at that area. And laceration actually higher up on dorsum of hand. Will splint hand and d/c with ortho f/u.

## 2022-02-15 NOTE — ED PROVIDER NOTE - OBJECTIVE STATEMENT
89 y/o F, PMH of AFib (on Eliquis), CHF, and HTN, presents to ED from [facility] for mechanical fall. Pt states that she was attempting to move her table, because her wheelchair was stuck. She pushed the table and it flew forwards, so she fell forward landing onto her L-side. Pt notes hit chin and L arm. Pt denies LOC. Also denies f/c, CP, HA, vision changes, SOB, abd pain, n/v/d, urinary sx, or weakness/numbness.

## 2022-02-15 NOTE — ED ADULT NURSE NOTE - NSIMPLEMENTINTERV_GEN_ALL_ED
Implemented All Fall with Harm Risk Interventions:  [place] to call system. Call bell, personal items and telephone within reach. Instruct patient to call for assistance. Room bathroom lighting operational. Non-slip footwear when patient is off stretcher. Physically safe environment: no spills, clutter or unnecessary equipment. Stretcher in lowest position, wheels locked, appropriate side rails in place. Provide visual cue, wrist band, yellow gown, etc. Monitor gait and stability. Monitor for mental status changes and reorient to person, place, and time. Review medications for side effects contributing to fall risk. Reinforce activity limits and safety measures with patient and family. Provide visual clues: red socks.

## 2022-02-15 NOTE — ED PROVIDER NOTE - NS ED ROS FT
Gen: Denies fever, weight loss  HEENT: Denies vision changes, ear pain, epistaxis, sore throat  CV: Denies chest pain, palpitations  Skin: Denies rash, erythema, color changes  Resp: Denies SOB, cough  Endo: Denies sensitivity to heat, cold, increased urination  GI: Denies abdominal pain, constipation, nausea, vomiting, diarrhea  Msk: Denies back pain, LE swelling; +hand/finger pain, +elbow pain  : Denies dysuria, increased frequency  Neuro: Denies LOC, weakness, numbness, tingling; +head injury  Psych: Denies hx of psych, hallucinations  ROS statement: all other ROS negative except as per HPI

## 2022-02-15 NOTE — ED PROVIDER NOTE - PHYSICAL EXAMINATION
PHYSICAL EXAM:  GENERAL: non-toxic appearing; in no respiratory distress  HEENT: Normocephalic, PERRL, EOMs intact b/l w/out deficits, no conjunctival pallor, MMM, +skin tear to L chin; no active bleeding  NECK: No JVD; FROM  CHEST/LUNG: CTAB no wheezes/rhonchi/rales  HEART: RRR no murmur/gallops/rubs  ABDOMEN: +BS, soft, NT, ND  EXTREMITIES: No LE edema, +2 radial pulses b/l, +2 DP/PT pulses b/l  MUSCULOSKELETAL: FROM of all 4 extremities  NERVOUS SYSTEM:  A&Ox3, No motor deficits or sensory deficits; CNII-XII intact; no focal neurologic deficits  SKIN:  No new rashes; +linear 3cm lac to dorsal L hand w/o active bleeding; Lt hand NVI.

## 2022-02-15 NOTE — ED PROVIDER NOTE - NSICDXPASTMEDICALHX_GEN_ALL_CORE_FT
PAST MEDICAL HISTORY:  Atrial fibrillation, unspecified type     Congestive heart failure, unspecified HF chronicity, unspecified heart failure type     Hypertension, unspecified type

## 2022-02-15 NOTE — ED PROVIDER NOTE - CLINICAL SUMMARY MEDICAL DECISION MAKING FREE TEXT BOX
89 y/o F, PMH of AFib (on Eliquis), CHF, and HTN, presents to ED from [facility] for mechanical fall. +head injury, -LOC, +AC use. Pt c/o L elbow and L 5th finger pain.  VSS. Physical exam significant for L elbow ttp and L 5th finger ttp; LUE is NVI. Pt also w/ linear lac to Lt hand.   Plan to check XR of Lt elbow, wrist and hand to assess for fx. Will also send CTH and CTCS to assess for bleed given head injury on eliquis. Will update Tdap

## 2022-02-15 NOTE — ED ADULT NURSE NOTE - OBJECTIVE STATEMENT
89y F hx afib on AC presents with fall at nursing home. pt states she was in her wheelchair and attempted to get from wheelchair to her bed and she states her wheelchair wheels "got stuck." pt says she then tried to get the wheelchair to move but it was too heavy and she fell. pt hit bottom of her L sided chin and landed on her L hand. pt with small abrasion to L chin- band aid in place from nursing home. pt main source iof pain is her L hand and L pinky. pt states "I think my pinky is broken." gauze wrap in place on L hand. Pt A&oX4. denies any LOC. calm and cooperative. awaiting MD damico.

## 2022-02-15 NOTE — ED PROVIDER NOTE - NSFOLLOWUPCLINICS_GEN_ALL_ED_FT
[facility]  Orthopedic Surgery  [address] [address]  Phone: [phone]  Fax:

## 2022-02-15 NOTE — ED PROVIDER NOTE - PATIENT PORTAL LINK FT
You can access the FollowMyHealth Patient Portal offered by [facility] by registering at the following website: [email or web address]. By joining [facility]’s FollowMyHealth portal, you will also be able to view your health information using other applications (apps) compatible with our system.

## 2022-02-16 VITALS
HEART RATE: 107 BPM | OXYGEN SATURATION: 98 % | RESPIRATION RATE: 18 BRPM | SYSTOLIC BLOOD PRESSURE: 127 MMHG | TEMPERATURE: 98 F | DIASTOLIC BLOOD PRESSURE: 78 MMHG

## 2022-02-16 PROCEDURE — 99284 EMERGENCY DEPT VISIT MOD MDM: CPT | Mod: 25

## 2022-02-16 PROCEDURE — 70450 CT HEAD/BRAIN W/O DYE: CPT | Mod: MA

## 2022-02-16 PROCEDURE — 72125 CT NECK SPINE W/O DYE: CPT | Mod: MA

## 2022-02-16 PROCEDURE — 73080 X-RAY EXAM OF ELBOW: CPT

## 2022-02-16 PROCEDURE — 90715 TDAP VACCINE 7 YRS/> IM: CPT

## 2022-02-16 PROCEDURE — 73110 X-RAY EXAM OF WRIST: CPT

## 2022-02-16 PROCEDURE — 73130 X-RAY EXAM OF HAND: CPT

## 2022-02-16 PROCEDURE — 12002 RPR S/N/AX/GEN/TRNK2.6-7.5CM: CPT

## 2022-02-16 PROCEDURE — 90471 IMMUNIZATION ADMIN: CPT

## 2022-02-16 RX ADMIN — TETANUS TOXOID, REDUCED DIPHTHERIA TOXOID AND ACELLULAR PERTUSSIS VACCINE, ADSORBED 0.5 MILLILITER(S): 5; 2.5; 8; 8; 2.5 SUSPENSION INTRAMUSCULAR at 01:05

## 2022-08-17 NOTE — ED ADULT NURSE NOTE - ED STAT RN HANDOFF TIME
Pt arrived to ED wi c/o covid and ear problem. Per mom, pt tested positive for covid on Sunday and today pt started to have real bad ear pain in left ear. Pt rates it 8/10. 07:05

## 2022-10-21 NOTE — DISCHARGE NOTE NURSING/CASE MANAGEMENT/SOCIAL WORK - NSDCPEELIQUIS_GEN_ALL_CORE
Called patient and she stated that she has chronic pain in the lower back and neck. Dr. Lozano previously prescribed tylenol 3 in 2021. Patient states that she does not take due to giving her a headache. Patient states that she had hurt her knee over the summer and says it mostly has healed but she s=does have occasional pain from that. She is seeing and orthopedic doctor and has physical therapy starting next Tuesday.     Patient currently taking three full dose aspirin and 6 tylenol daily and 50mg of tramadol that she received from a friend. She reports getting relief from the tramadol but not quite enough.     Patient asking for higher dose of Tramadol.     Will discuss with Dr. Lozano for recommendations.   
Patient called in requesting medication tramadol called into pharmacy   Patient said she could be reached for further questions   
Patient called stating she would like a different medication for pain. Patient states she's on aspirin and tylenol, she states she's been taking 50MG of Tramadol that she got from someone. Patient states she is no longer taking the Tylenol with Codeine because it gave her headaches. Patient would like to come off the aspirins and be prescribed a higher dose of Tramadol.  Please advise.    Any questions, please call [phone].      
Please advise?   
Prescription sent for Tramadol 50mg BID patient notified.   
Apixaban/Eliquis - Compliance/Apixaban/Eliquis - Dietary Advice/Apixaban/Eliquis - Follow up monitoring/Apixaban/Eliquis - Potential for adverse drug reactions and interactions

## 2023-01-06 NOTE — ED ADULT NURSE NOTE - ED STAT RN HANDOFF DETAILS
-- DO NOT REPLY / DO NOT REPLY ALL --  -- Message is from Engagement Center Operations ([facility]) --    Patient returns call to clinic.  Nurse is unavailable.  Please try calling again.                Given to Indira OLIVA

## 2023-01-18 ENCOUNTER — RESULT REVIEW (OUTPATIENT)
Age: 88
End: 2023-01-18

## 2023-07-29 ENCOUNTER — RESULT REVIEW (OUTPATIENT)
Age: 88
End: 2023-07-29

## 2024-02-01 ENCOUNTER — RESULT REVIEW (OUTPATIENT)
Age: 89
End: 2024-02-01

## 2024-05-01 NOTE — PHYSICAL THERAPY INITIAL EVALUATION ADULT - ACTIVE RANGE OF MOTION EXAMINATION, REHAB EVAL
bilateral upper extremity Active ROM was WFL (within functional limits)/bilateral  lower extremity Active ROM was WFL (within functional limits) 1.98

## 2024-11-06 NOTE — ED ADULT NURSE NOTE - CCCP TRG CHIEF CMPLNT
CARDIOLOGY INPATIENT PROGRESS NOTE:      REASON FOR CONSULT:  Chest pain    REQUESTED BY:  Dr. Burr    IMPRESSION    Coronary artery disease with 50% stenosis mid LAD, 70% distally in  small vessel.  RCA unable to be engaged directly.  Medical management planned  Palpitations.  No ectopy on telemetry.  Resolved with metoprolol restart  Prediabetes  Hyperlipidemia.    Obesity.  BMI 49.26  Suspected sleep apnea        PLAN:    Will order coronary CTA to be obtained in the next 2 to 3 weeks following contrast administration from this coronary angiogram.  This was discussed with the patient.  She is already taking metoprolol and heart rate reasonably controlled, may need additional  If concern on coronary CTA for significant obstructive CAD in the RCA, will plan for repeat catheterization likely via right femoral access.  Continue aspirin 81 mg daily, atorvastatin 80 mg daily, metoprolol succinate 50 mg daily, Imdur 30 mg daily.  Lifestyle changes including healthy diet, regular exercise discussed  Continue to follow with Destiny Boyd PA-C for weight management  Obtain sleep study as outpatient as already scheduled  Schedule follow-up in the office with Dr. Bradshaw in December after coronary CTA has been completed  Patient may discharge home today  Dr. Bradshaw to provide any additional input    The above impression and plan has been discussed with and formulated in conjunction with Dr. Bradshaw and communicated with hospitalist, Dr. Conte    Thank you for the opportunity to care for this patient.       History of Present Illness:  Patient is a 53 year old female who was admitted with Palpitations [R00.2]  Prediabetes [R73.03]  Chest pain, unspecified type [R07.9]  Class 3 obesity [E66.813].    Follow up:  11/6/2024  Cardiac catheterization with severe stenosis of distal LAD, small vessel with plan for medical management.  50% stenosis proximally.  Unable to directly engage RCA.  Plan is for medical management with  aspirin, statin, continue beta-blocker, addition of Imdur as well as lifestyle changes.  Patient to be scheduled for coronary CTA as an outpatient to reevaluate the RCA as well as obtain FFR of the proximal and distal LAD lesions.  This was discussed with the patient and she is agreeable.  No chest pain overnight.  She notes a little bit of swelling in her right arm proximal to the radial access site but no hematoma or clear induration.  She has ambulated in the room without chest pain.  She had some palpitations yesterday likely due to holding metoprolol which resolved with readministration of that medication.  Telemetry did not show any abnormal rhythm during the time of her symptoms    11/05/2024  Continues with low level chest discomfort.  For cath today.  Her questions are answered.  Lipids returned with .      Initial consult:  11/04/2024  This 53-year-old patient follows with Dr. Bradshaw, was seen in consultation in August 2020 for for mildly abnormal coronary artery calcium score (2) and strong family history of CAD including 2 grandparents, multiple echoes with early death.    She presented with chest pain that she is been having on and off for the past 4 to 5 days.  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  She was having to shift position but was unable to attain comfort.  Describes it more of a pressure.  She felt the need to take a deep breath but no pleuritic pain.  No shortness of breath with it.  No dizziness or diaphoresis.  She has noted a little bit of swelling in her hands and feet which she thought was unusual.  She had an echo done September 24, 2024 which was relatively unremarkable.  Distant history of stress test in 2021.  She was scheduled for an outpatient stress test but given her worsening chest pain was advised to report to the emergency department.  Troponin negative x 2.  EKG with low  voltage QRS but no ischemic changes, normal sinus rhythm.  She does have prior history of palpitations for which she takes a beta-blocker that helps but does not completely resolve them    Lifelong non-smoker.  Rarely drinks alcohol.  Specifically denies illicit drug use, marijuana, cocaine, heroin, methamphetamine    Cardiac symptoms:  CP: +  SOB/FUNES: -  Orthopnea/PND:  -  Palpitations:  -  Dizziness/Syncope:  -  Edema:  + subjective    Cardiac Risk:  Smoking:  -  HTN:  -  Cholesterol:  -  DM:  prediabetes  Family Hx:  +    Past Medical History:   Diagnosis Date    Abnormal pap     Discogenic low back pain     Esophageal reflux disease     Genital warts     Morbid obesity  (CMD)      Past Surgical History:   Procedure Laterality Date    Anterior cruciate ligament repair  01/09/2012    right knee    Breast surgery      Colonoscopy diagnostic  09/13/2004    Colonoscopy w/ polypectomy  06/17/2022 6/17/22 tubular adenoma repeat in 3 yrs Dr. Starkey    Excise breast lesion  08/21/2003    right lumpectomy benign lesion    Laparoscopy, cholecystectomy  03/09/2004    Removal gallbladder      Tendon release       ALLERGIES:  No Known Allergies  Prior to Admission medications    Medication Sig Start Date End Date Taking? Authorizing Provider   semaglutide-Weight Management (Wegovy) 2.4 MG/0.75ML injection Inject 2.4 mg into the skin every 7 days. Indications: OBESITY 10/8/24   Kristofer Simms MD   metoPROLOL succinate (TOPROL-XL) 50 MG 24 hr tablet Take 1 tablet by mouth daily. 9/16/24   Kristofer Simms MD   VITAMIN E PO Take 2 tablets by mouth daily.    Provider, Outside   Cholecalciferol (Vitamin D3) 50 mcg (2,000 units) capsule Take 100 mcg by mouth daily.    Provider, Outside   Multiple Vitamins-Minerals (MULTIVITAMIN PO) Take  by mouth.    Provider, Outside       In Hospital Medications:  Current Facility-Administered Medications   Medication Dose Route Frequency Provider Last Rate Last Admin    sodium chloride  0.9 % injection 2 mL  2 mL Intracatheter 2 times per day Landy Bradshaw MD   2 mL at 11/06/24 0955    aspirin (ECOTRIN) enteric coated tablet 81 mg  81 mg Oral Daily Landy Bradshaw MD   81 mg at 11/06/24 0955    atorvastatin (LIPITOR) tablet 80 mg  80 mg Oral Nightly Landy Bradshaw MD   80 mg at 11/05/24 2200    isosorbide mononitrate (IMDUR) ER tablet 30 mg  30 mg Oral Daily Landy Bradshaw MD   30 mg at 11/06/24 0956    Potassium Standard Replacement Protocol (Levels 3.5 and lower)   Does not apply See Admin Instructions Landy Bradshaw MD        Potassium Replacement (Levels 3.6 - 4)   Does not apply See Admin Instructions Landy Bradshaw MD        Magnesium Standard Replacement Protocol   Does not apply See Admin Instructions Landy Bradshaw MD        [Held by provider] enoxaparin (LOVENOX) injection 40 mg  40 mg Subcutaneous 2 times per day Landy Bradshaw MD        metoPROLOL succinate (TOPROL-XL) ER tablet 50 mg  50 mg Oral Daily Landy Bradshaw MD   50 mg at 11/06/24 0955     Continuous Infusions:  Current Facility-Administered Medications   Medication Dose Route Frequency Provider Last Rate Last Admin       Social History:  Reviewed in [facility]      Family History:  Reviewed in Epic        Review of Systems:  ROS   12 point review of systems was negative other than what has already been discussed.          PHYSICAL EXAM:    Vital 24 Hour Range Last Value   Temperature Temp  Min: 97.2 °F (36.2 °C)  Max: 98.1 °F (36.7 °C) 97.6 °F (36.4 °C) (11/06/24 0400)   Pulse Pulse  Min: 55  Max: 208 88 (11/06/24 0952)   Respiratory Resp  Min: 11  Max: 20 18 (11/06/24 0400)   Non-Invasive  Blood Pressure BP  Min: 91/53  Max: 165/104 134/86 (11/06/24 0952)   Pulse Oximetry SpO2  Min: 90 %  Max: 99 % 96 % (11/06/24 0952)     Physical Exam   Constitutional: She appears healthy. No distress.   Eyes: Conjunctivae are normal.   Sclera anicteric.     Neck: No JVD present.   Carotid upstrokes full, symmetric.  No carotid bruit.      Cardiovascular: Normal rate, regular rhythm, S1 normal, S2 normal, normal heart sounds, intact distal pulses and normal pulses. No extrasystoles are present. PMI is not displaced. Exam reveals no gallop, no S3 and no S4.   No murmur heard.  Pulses:       Radial pulses are 2+ on the right side and 2+ on the left side.        Femoral pulses are 2+ on the left side.  Pulmonary/Chest: Effort normal and breath sounds normal. No stridor. She has no wheezes. She has no rales. She exhibits no tenderness.   Abdominal: Soft.   Musculoskeletal:         General: No edema.      Cervical back: Neck supple.   Neurological: She is alert and oriented to person, place, and time.   Skin: Skin is warm and dry. No rash noted. No cyanosis. No jaundice. Nails show no clubbing.   Right radial cath access site inspected.  Dressing clean, dry.  No evidence of hematoma or infection.  Very slight swelling proximal to the dressing but without induration.  Reviewed with Dr. Bradshaw          Intake/Output:    Intake/Output Summary (Last 24 hours) at 11/6/2024 1009  Last data filed at 11/6/2024 0957  Gross per 24 hour   Intake 2760 ml   Output 3425 ml   Net -665 ml         Today Admit   Weight (!) 143.7 kg (316 lb 12.8 oz) (11/06/24 0400) Weight: (!) 143.7 kg (316 lb 12.8 oz) (11/04/24 1623)     Weight    11/04/24 1623 11/06/24 0400   Weight: (!) 143.7 kg (316 lb 12.8 oz) (!) 143.7 kg (316 lb 12.8 oz)         Studies and Labs:    12-Lead EKG Interpretation:     11/04/2024  2 tracings reviewed  NSR.  Low voltage QRS.  Similar to prior tracings.       Stress test:   08/2021  Exercise portion of Exercise Cardiolite nuclear stress test was negativer ischemia, both by EKG criteria for ischemia and clinical symptoms, at the level of workload and heart rate achieved.   Myocardial perfusion imaging showed  no area of ischemia or infarction.    Normal  wall motion imaging  Ejection fraction of 55 %   Normal  perfusion scan       Echocardiogram:  09/2024  Technically difficult study.  Normal left ventricular size and systolic function, EF 63 %.  Grade I diastolic dysfunction of the left ventricle (impaired relaxation pattern).  Probably normal right ventricular size and systolic function.  Right ventricular systolic pressure; 23 mmHg.  Normal left atrial chamber size.  Agitated saline was injected through a peripheral vein and did show evidence of a shunt with valsalva. No shunt at rest.  Trivial mitral valve regurgitation.  No tricuspid valve regurgitation.  Dilated ascending aorta measuring 3.7 cm.  Dilated IVC with normal respiratory variation.  No pericardial effusion.  No prior study available for comparison.     Cardiac Catheterization:  11/05/2024  Prox Cx to Dist Cx lesion with 15% stenosis.    1st Mrg lesion with 25% stenosis.    2nd Mrg lesion with 25% stenosis.    3rd Mrg lesion with 25% stenosis.    Dist LAD lesion with 70% stenosis.    Prox LAD to Mid LAD lesion with 50% stenosis.  ASCVD: Severe stenosis of distal LAD involving long segment 70% (small diameter segment probably less than 2 mm) this will be treated by aggressive medical therapy follow-up IVUS guided PCI if patient fails medical therapy. There is mild stenosis of the proximal to mid LAD 50%.  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 Nonselective RCA angiogram shows no significant stenosis.  LVEDP 4 mmHg   Recommendation:  Start aspirin 81 mg daily, atorvastatin 80 mg daily, continue metoprolol, start Imdur 30 mg daily.  Consider CT angiogram FFR versus repeat coronary angiogram with IFR/FFR of the proximal LAD and IVUS of distal LAD atherosclerotic stenosis and selectively engage right coronary artery via femoral artery approach.     CABG:  None    CAC Score:  08/2024  Agatston Coronary Calcium Score  LMA: 0  LAD: 0  LCX: 0  RCA: 2  Total: 2  Percentile: 80%  Great Vessels  Ascending Aorta: 37  mm  Descending Aorta: 26 mm  Main Pulmonary Artery: 29 mm  Extra-coronary Calcification: None.  Heart/Pericardium: Normal.  Other Findings: No concerning incidental finding.  : No additional finding.    IMPRESSION:  *  Total coronary artery calcium score is 2. 80% of people matched for age,  gender, and race/ethnicity who are free of clinical cardiovascular disease  and treated diabetes had less calcium than was detected in this study.    Chest X-ray:  11/04/2024  The lungs are clear. The heart size is normal. The pulmonary vessels are  normally distributed. The mediastinum is within normal limits. No pleural  effusion is present. No change has occurred since 8/12/2021.  IMPRESSION:    Negative chest     Telemetry Interpretation:  Sinus rhythm, 80s.  No ventricular ectopy    LAB RESULTS      Recent Labs   Lab 11/06/24  0528 11/05/24  0447 11/04/24  1332 11/04/24  1124   WBC 6.8 7.3  --  6.5   HGB 13.5 13.9  --  13.5   HCT 40.8 41.0  --  40.5    250  --  252   Sodium 139 140  --  145   Potassium 3.7 4.1  --  3.9   Chloride 104 105  --  105   Carbon Dioxide 25 27  --  28   BUN 7 12  --  13   Creatinine 0.59 0.60  --  0.64   Glucose 89 91  --  103*   Calcium 8.5 9.0  --  8.6   Magnesium 1.9 2.1 2.0 1.9       Recent Labs   Lab 11/04/24  1332 11/04/24  1124 06/06/24  0903 03/25/24  0749   Cholesterol 170  --   --  152   Triglycerides 69  --   --  71   HDL 52  --   --  47*   CALCLDL 104  --   --  91   Albumin  --  3.2* 4.0 3.6          Recent Labs   Lab 11/04/24  1124 08/16/24  1112 01/18/24  1221   TSH 1.770 2.042 2.303       Recent Labs   Lab 11/04/24  1332 11/04/24  1124   D Dimer, Quantitative  --  0.48   Troponin I, High Sensitivity <4 <4   NT-proBNP  --  111       Recent Labs   Lab 11/04/24  1124   GOT/AST 13   GPT 19   Alkaline Phosphatase 71   Bilirubin, Total 0.3           Lily Ni PA-C  Cardiovascular Services  [facility]: [phone]  [email or web address]  11/6/2024,10:09 AM.  fall

## 2025-04-20 NOTE — PHYSICAL THERAPY INITIAL EVALUATION ADULT - FUNCTIONAL LIMITATIONS, PT EVAL
Right midshaft femur fracture.  Plan for IM nail. Risks/benefits/alternatives discussed with patient and .    I have spent over 75 minutes planning care with anesthesia and trauma svc as well as counselling pt/ regarding treatment/outcomes.
self-care/home management/community/leisure